# Patient Record
Sex: FEMALE | Race: WHITE
[De-identification: names, ages, dates, MRNs, and addresses within clinical notes are randomized per-mention and may not be internally consistent; named-entity substitution may affect disease eponyms.]

---

## 2019-01-22 ENCOUNTER — HOSPITAL ENCOUNTER (INPATIENT)
Dept: HOSPITAL 36 - ER | Age: 69
LOS: 5 days | Discharge: NURSING, REHAB OR LONG-TERM CARE | DRG: 870 | End: 2019-01-27
Attending: FAMILY MEDICINE | Admitting: FAMILY MEDICINE
Payer: MEDICARE

## 2019-01-22 DIAGNOSIS — A41.9: Primary | ICD-10-CM

## 2019-01-22 DIAGNOSIS — J98.11: ICD-10-CM

## 2019-01-22 DIAGNOSIS — K80.80: ICD-10-CM

## 2019-01-22 DIAGNOSIS — E11.22: ICD-10-CM

## 2019-01-22 DIAGNOSIS — I42.9: ICD-10-CM

## 2019-01-22 DIAGNOSIS — N17.9: ICD-10-CM

## 2019-01-22 DIAGNOSIS — J44.0: ICD-10-CM

## 2019-01-22 DIAGNOSIS — G93.41: ICD-10-CM

## 2019-01-22 DIAGNOSIS — Z93.1: ICD-10-CM

## 2019-01-22 DIAGNOSIS — N18.3: ICD-10-CM

## 2019-01-22 DIAGNOSIS — N39.0: ICD-10-CM

## 2019-01-22 DIAGNOSIS — E87.1: ICD-10-CM

## 2019-01-22 DIAGNOSIS — M10.9: ICD-10-CM

## 2019-01-22 DIAGNOSIS — I48.91: ICD-10-CM

## 2019-01-22 DIAGNOSIS — J96.20: ICD-10-CM

## 2019-01-22 DIAGNOSIS — E78.5: ICD-10-CM

## 2019-01-22 DIAGNOSIS — K72.10: ICD-10-CM

## 2019-01-22 DIAGNOSIS — Z93.0: ICD-10-CM

## 2019-01-22 DIAGNOSIS — F03.90: ICD-10-CM

## 2019-01-22 DIAGNOSIS — J18.9: ICD-10-CM

## 2019-01-22 DIAGNOSIS — E87.6: ICD-10-CM

## 2019-01-22 DIAGNOSIS — Z86.73: ICD-10-CM

## 2019-01-22 DIAGNOSIS — Z99.11: ICD-10-CM

## 2019-01-22 DIAGNOSIS — I12.9: ICD-10-CM

## 2019-01-22 DIAGNOSIS — D63.8: ICD-10-CM

## 2019-01-22 DIAGNOSIS — E11.21: ICD-10-CM

## 2019-01-22 DIAGNOSIS — E86.0: ICD-10-CM

## 2019-01-22 DIAGNOSIS — K76.0: ICD-10-CM

## 2019-01-22 DIAGNOSIS — E03.9: ICD-10-CM

## 2019-01-22 LAB
ALBUMIN SERPL-MCNC: 4 GM/DL (ref 3.7–5.3)
ALBUMIN/GLOB SERPL: 0.9 {RATIO} (ref 1–1.8)
ALP SERPL-CCNC: 121 U/L (ref 34–104)
ALT SERPL-CCNC: 100 U/L (ref 7–52)
AMORPH SED URNS QL MICRO: (no result)
ANION GAP SERPL CALC-SCNC: 22.9 MMOL/L (ref 7–16)
APPEARANCE UR: (no result)
AST SERPL-CCNC: 76 U/L (ref 13–39)
BACTERIA #/AREA URNS HPF: (no result) /HPF
BILIRUB SERPL-MCNC: 1.1 MG/DL (ref 0.3–1)
BILIRUB UR-MCNC: NEGATIVE MG/DL
BUN SERPL-MCNC: 151 MG/DL (ref 7–25)
CALCIUM SERPL-MCNC: 10.6 MG/DL (ref 8.6–10.3)
CHLORIDE SERPL-SCNC: 76 MEQ/L (ref 98–107)
CO2 SERPL-SCNC: 29.3 MEQ/L (ref 21–31)
COLOR UR: YELLOW
CREAT SERPL-MCNC: 1.9 MG/DL (ref 0.6–1.2)
EOSINOPHIL NFR BLD AUTO: 3 % (ref 0–5)
EOSINOPHIL NFR BLD: 2 % (ref 0–5)
EPI CELLS URNS QL MICRO: (no result) /LPF
ERYTHROCYTE [DISTWIDTH] IN BLOOD BY AUTOMATED COUNT: 15.5 % (ref 11.5–20)
GLOBULIN SER-MCNC: 4.6 GM/DL
GLUCOSE SERPL-MCNC: 237 MG/DL (ref 70–105)
GLUCOSE UR STRIP-MCNC: NEGATIVE MG/DL
HCT VFR BLD CALC: 29.1 % (ref 41–60)
HGB BLD-MCNC: 9.9 GM/DL (ref 12–16)
KETONES UR STRIP-MCNC: NEGATIVE MG/DL
LEUKOCYTE ESTERASE UR-ACNC: (no result)
LYMPHOCYTES # BLD MANUAL: 5 % (ref 20–50)
LYMPHOCYTES NFR BLD AUTO: 5 % (ref 20–50)
MCH RBC QN AUTO: 33.4 PG (ref 27–31)
MCHC RBC AUTO-ENTMCNC: 33.9 PG (ref 28–36)
MCV RBC AUTO: 98.8 FL (ref 81–100)
MICRO URNS: YES
MONOCYTES # BLD MANUAL: 3 % (ref 2–10)
MONOCYTES NFR BLD AUTO: 5 % (ref 2–10)
NEUTROPHILS NFR BLD AUTO: 87 % (ref 40–80)
NEUTROPHILS NFR BLD AUTO: 87 % (ref 40–80)
NEUTS BAND NFR BLD: 3 % (ref 0–10)
NITRITE UR QL STRIP: NEGATIVE
PH UR STRIP: 5.5 [PH] (ref 4.6–8)
PLATELET # BLD: 409 TH/CMM (ref 150–400)
PMV BLD AUTO: 7.2 FL
POTASSIUM SERPL-SCNC: 3.2 MEQ/L (ref 3.5–5.1)
PROT UR STRIP-MCNC: NEGATIVE MG/DL
RBC # BLD AUTO: 2.94 MIL/CMM (ref 3.8–5.2)
RBC # UR STRIP: NEGATIVE /UL
RBC #/AREA URNS HPF: (no result) /HPF (ref 0–5)
SODIUM SERPL-SCNC: 125 MEQ/L (ref 136–145)
SP GR UR STRIP: 1.01 (ref 1–1.03)
TROPONIN I SERPL-MCNC: 0.05 NG/ML (ref 0.01–0.05)
URINALYSIS COMPLETE PNL UR: (no result)
UROBILINOGEN UR STRIP-ACNC: 0.2 E.U./DL (ref 0.2–1)
WBC # BLD AUTO: 14.3 TH/CMM (ref 4.8–10.8)
WBC #/AREA URNS HPF: (no result) /HPF (ref 0–5)
YEAST URNS QL MICRO: (no result) /HPF

## 2019-01-22 PROCEDURE — Z7610: HCPCS

## 2019-01-22 PROCEDURE — C9113 INJ PANTOPRAZOLE SODIUM, VIA: HCPCS

## 2019-01-22 PROCEDURE — 5A1955Z RESPIRATORY VENTILATION, GREATER THAN 96 CONSECUTIVE HOURS: ICD-10-PCS | Performed by: FAMILY MEDICINE

## 2019-01-22 NOTE — ED PHYSICIAN CHART
ED Chief Complaint/HPI





- Patient Information


Date Seen:: 01/22/19


Time Seen:: 20:16


Chief Complaint:: Abnormal labs


History of Present Illness:: 


69 yo female with history of COPD, respiratory failure on ventilation via a 

trach tube, G-tube, sepsis, dysphagia, hepatic failure, anemia, edema, 

cardiomyopathy, AFib, TIA/stroke, gout, hypothyroidism and CKD was brought from 

[facility] to ER for evaluation of abnormal labs (, K 3.2) today. Patient is 

obtunded. History is based on chart review.


Allergies:: 


 Allergies











Allergy/AdvReac Type Severity Reaction Status Date / Time


 


No Known Allergies Allergy   Verified 01/22/19 19:59











Vitals:: 


 Vital Signs - 8 hr











  01/22/19





  19:45


 


Temp 98.7 F


 


HR 84


 


RR 16


 


/48


 


O2 Sat % 100














ED Review of Systems





- Review of Systems


General/Constitutional: No fever


Skin: No bruising


Eyes: No pain


ENT: No nasal drainage


Neck: Other (Tracheostomy)


Cardio Vascular: No chest pain, edema


Pulmonary: SOB


GI: No nausea, No vomiting


Musculoskeletal: No muscle pain


Neurological: No seizure





ED Past Medical History





- Past Medical History


Past Medical History: Asthma/COPD, CVA/TIA, Thyroid disorder (hypothyroidism), 

Other (RESPIRATORY FAILURE, G-TUBE, PNEUMONIA, SEPSIS, DYSPHAGIA, HEPATIC 

FAILURE, EDEMA, ANEMIA, CARDIOMYOPATHY, A-FIB, GOUT, CKD)


Social History: Non Smoker, No Alcohol, No Drug Use


Surgical History: PEG/GTube





Family Medical History





- Family Member


  ** Mother


History Unknown: Yes





ED Physical Exam





- Physical Examination


Other Gen/Cons comments:: 


Obtunded


Head: Atraumatic


Eyes: PERRL


Other Skin comments:: 


Edema


ENMT: Nasal exam nl


Other Neck comments:: 


Trach intact


Other Respiratory comments:: 


B/L rhonchi


Cardio Vascular: RRR, No murmur, gallop, rubs, NL S1 S2


GI: Nondistended


Other GI comments:: 


G-tube intact


Other Extremities comments:: 


1+ edema of BLE


Other Neuro/Psych comments:: 


obtunded





ED Labs/Radiology/EKG Results





- Lab Results


Results: 





 Laboratory Last Values











WBC  15.2 Th/cmm (4.8-10.8)  H  01/23/19  04:25    


 


RBC  2.43 Mil/cmm (3.80-5.20)  L  01/23/19  04:25    


 


Hgb  8.4 gm/dL (12-16)  L  01/23/19  04:25    


 


Hct  24.2 % (41.0-60)  L  01/23/19  04:25    


 


MCV  99.4 fl ()   01/23/19  04:25    


 


MCH  34.4 pg (27.0-31.0)  H  01/23/19  04:25    


 


MCHC Differential  34.6 pg (28.0-36.0)   01/23/19  04:25    


 


RDW  16.1 % (11.5-20.0)   01/23/19  04:25    


 


Plt Count  355 Th/cmm (150-400)   01/23/19  04:25    


 


MPV  7.4 fl  01/23/19  04:25    


 


Add Manual Diff  YES   01/23/19  04:25    


 


Neutrophils %  87.0 % (40.0-80.0)  H  01/22/19  20:15    


 


Band Neutrophils %  5 % (0-10)   01/23/19  04:25    


 


Lymphocytes %  5.0 % (20.0-50.0)  L  01/22/19  20:15    


 


Monocytes %  5.0 % (2.0-10.0)   01/22/19  20:15    


 


Eosinophils %  3.0 % (0.0-5.0)   01/22/19  20:15    


 


Neutrophils (Manual)  79 % (40-80)   01/23/19  04:25    


 


Lymphocytes  3 % (20-50)  L  01/23/19  04:25    


 


Monocytes  3 % (2-10)   01/23/19  04:25    


 


Eosinophils  10 % (0-5)  H  01/23/19  04:25    


 


Basophils  0 % (0-3)   01/23/19  04:25    


 


Sodium  132 mEq/L (136-145)  L  01/23/19  04:25    


 


Potassium  3.2 mEq/L (3.5-5.1)  L  01/23/19  04:25    


 


Chloride  89 mEq/L ()  L  01/23/19  04:25    


 


Carbon Dioxide  26.9 mEq/L (21.0-31.0)   01/23/19  04:25    


 


Anion Gap  19.3  (7.0-16.0)  H  01/23/19  04:25    


 


BUN  125 mg/dL (7-25)  H*  01/23/19  04:25    


 


Creatinine  1.6 mg/dL (0.6-1.2)  H  01/23/19  04:25    


 


Est GFR ( Amer)  41.5 ml/min (>90)   01/23/19  04:25    


 


Est GFR (Non-Af Amer)  34.3 ml/min  01/23/19  04:25    


 


BUN/Creatinine Ratio  78.1   01/23/19  04:25    


 


Glucose  238 mg/dL ()  H  01/23/19  04:25    


 


POC Glucose  251 MG/DL (70 - 105)  H  01/23/19  05:18    


 


Whole Bld Lactic Acid  4.81 mmol/L (0.60-1.99)  H*  01/23/19  07:15    


 


Calcium  9.4 mg/dL (8.6-10.3)   01/23/19  04:25    


 


Phosphorus  2.9 mg/dL (2.5-5.0)   01/23/19  04:25    


 


Magnesium  3.1 mg/dL (1.9-2.7)  H  01/23/19  04:25    


 


Total Bilirubin  1.1 mg/dL (0.3-1.0)  H  01/22/19  20:15    


 


AST  76 U/L (13-39)  H  01/22/19  20:15    


 


ALT  100 U/L (7-52)  H  01/22/19  20:15    


 


Alkaline Phosphatase  121 U/L ()  H  01/22/19  20:15    


 


Troponin I  0.05 ng/mL (0.01-0.05)   01/22/19  20:15    


 


B-Natriuretic Peptide  146.0 pg/mL (5.0-100.0)  H  01/23/19  04:25    


 


Total Protein  8.6 gm/dL (6.0-8.3)  H  01/22/19  20:15    


 


Albumin  4.0 gm/dL (3.7-5.3)   01/22/19  20:15    


 


Globulin  4.6 gm/dL  01/22/19  20:15    


 


Albumin/Globulin Ratio  0.9  (1.0-1.8)  L  01/22/19  20:15    


 


Triglycerides  813 mg/dL (<150)  H  01/23/19  04:25    


 


Cholesterol  397 mg/dL (<200)  H  01/23/19  04:25    


 


LDL Cholesterol Direct  187 mg/dL ()   01/23/19  04:25    


 


HDL Cholesterol  34 mg/dL (23-92)   01/23/19  04:25    


 


TSH  3.28 uIU/ml (0.34-5.60)   01/23/19  04:25    


 


Urine Source  CLEAN C   01/22/19  20:30    


 


Urine Color  YELLOW   01/22/19  20:30    


 


Urine Clarity  HAZY  (CLEAR)   01/22/19  20:30    


 


Urine pH  5.5  (4.6 - 8.0)   01/22/19  20:30    


 


Ur Specific Gravity  1.010  (1.005-1.030)   01/22/19  20:30    


 


Urine Protein  NEGATIVE mg/dL (NEGATIVE)   01/22/19  20:30    


 


Urine Glucose (UA)  NEGATIVE mg/dL (NEGATIVE)   01/22/19  20:30    


 


Urine Ketones  NEGATIVE mg/dL (NEGATIVE)   01/22/19  20:30    


 


Urine Blood  NEGATIVE  (NEGATIVE)   01/22/19  20:30    


 


Urine Nitrate  NEGATIVE  (NEGATIVE)   01/22/19  20:30    


 


Urine Bilirubin  NEGATIVE  (NEGATIVE)   01/22/19  20:30    


 


Urine Urobilinogen  0.2 E.U./dL (0.2 - 1.0)   01/22/19  20:30    


 


Ur Leukocyte Esterase  SMALL  (NEGATIVE)  H  01/22/19  20:30    


 


Urine RBC  5-10 /hpf (0-5)  H  01/22/19  20:30    


 


Urine WBC  2-5 /hpf (0-5)   01/22/19  20:30    


 


Ur Epithelial Cells  RARE /lpf (FEW)   01/22/19  20:30    


 


Amorphous Sediment  FEW URATES  (NONE SEEN)   01/22/19  20:30    


 


Urine Bacteria  FEW /hpf (NONE SEEN)   01/22/19  20:30    


 


Urine Yeast  FEW /hpf (NONE SEEN)  H  01/22/19  20:30    














- Radiology Results


Results: 


CXR: suspect trace left effusion, no focal consolidation, tracheostomy tube





- EKG Interpretations


EKG Time:: 20:37


Rate & Rhythm: 79 bpm, SR


Axis: normal P axis


Intervals: prolonged QT interval


Comments:: 


Abnormal EKG





ED Assessment





- Assessment


General Assessment: 


Sepsis


UTI


Dehydration


Hyponatremia


Hypokalemia


Acute renal failure, pre-renal


Metabolic encephalopathy


Anemia, normocytic





Assessment/Comments:: 


CBC, CMP, Trop, BNP, lactic acid


Urine culture, sputum culture, blood culture


EKG, CXR


Trach to vent: AC16, , PEEP5, 2.5L O2


NS 1L IV bolus


Rocephin 1g IV


K-rider 20mEq IV


Admit to ICU





ED Septic Shock





- .


Is Septic Shock (SBP<90, OR Lactate>4 mmol\L) present?: No





- <6hrs of presentation:


Vital Signs: 


 Vital Signs - 8 hr











  01/22/19





  19:45


 


Temp 98.7 F


 


HR 84


 


RR 16


 


/48


 


O2 Sat % 100














ED Reassessment (Disposition)





- Reassessment


Reassessment Condition:: Improved





- Patient Disposition


Discharge/Transfer:: Acute Care w/in this hosp


Admitting Medical Physician:: Briseida Coelho

## 2019-01-23 VITALS — SYSTOLIC BLOOD PRESSURE: 109 MMHG | DIASTOLIC BLOOD PRESSURE: 48 MMHG

## 2019-01-23 LAB
ANION GAP SERPL CALC-SCNC: 19.3 MMOL/L (ref 7–16)
BASOPHILS NFR BLD: 0 % (ref 0–3)
BUN SERPL-MCNC: 125 MG/DL (ref 7–25)
CALCIUM SERPL-MCNC: 9.4 MG/DL (ref 8.6–10.3)
CHLORIDE SERPL-SCNC: 89 MEQ/L (ref 98–107)
CHOLEST SERPL-MCNC: 397 MG/DL (ref ?–200)
CO2 SERPL-SCNC: 26.9 MEQ/L (ref 21–31)
CREAT SERPL-MCNC: 1.6 MG/DL (ref 0.6–1.2)
EOSINOPHIL # BLD MANUAL: (no result) 10*3/UL
EOSINOPHIL BLD QL WRIGHT STN: (no result)
EOSINOPHIL NFR BLD: 10 % (ref 0–5)
ERYTHROCYTE [DISTWIDTH] IN BLOOD BY AUTOMATED COUNT: 16.1 % (ref 11.5–20)
GLUCOSE SERPL-MCNC: 238 MG/DL (ref 70–105)
HCT VFR BLD CALC: 24.2 % (ref 41–60)
HDLC SERPL-MCNC: 34 MG/DL (ref 23–92)
HGB BLD-MCNC: 8.4 GM/DL (ref 12–16)
LYMPHOCYTES # BLD MANUAL: 3 % (ref 20–50)
MAGNESIUM SERPL-MCNC: 3.1 MG/DL (ref 1.9–2.7)
MCH RBC QN AUTO: 34.4 PG (ref 27–31)
MCHC RBC AUTO-ENTMCNC: 34.6 PG (ref 28–36)
MCV RBC AUTO: 99.4 FL (ref 81–100)
MONOCYTES # BLD MANUAL: 3 % (ref 2–10)
NEUTROPHILS NFR BLD AUTO: 79 % (ref 40–80)
NEUTS BAND NFR BLD: 5 % (ref 0–10)
PCO2 BLDA: 39 MMHG (ref 35–45)
PHOSPHATE SERPL-MCNC: 2.9 MG/DL (ref 2.5–5)
PLATELET # BLD: 355 TH/CMM (ref 150–400)
PMV BLD AUTO: 7.4 FL
PO2 BLDA: 104 MMHG (ref 80–100)
POTASSIUM SERPL-SCNC: 3.2 MEQ/L (ref 3.5–5.1)
RBC # BLD AUTO: 2.43 MIL/CMM (ref 3.8–5.2)
SAO2 % BLDA: 98 % (ref 92–100)
SODIUM SERPL-SCNC: 132 MEQ/L (ref 136–145)
TRIGL SERPL-MCNC: 813 MG/DL (ref ?–150)
WBC # BLD AUTO: 15.2 TH/CMM (ref 4.8–10.8)

## 2019-01-23 RX ADMIN — SODIUM CHLORIDE SCH MLS/HR: 9 INJECTION, SOLUTION INTRAVENOUS at 12:40

## 2019-01-23 RX ADMIN — ALBUTEROL SULFATE PRN MG: 2.5 SOLUTION RESPIRATORY (INHALATION) at 15:22

## 2019-01-23 RX ADMIN — EPOETIN ALFA SCH UNITS: 20000 SOLUTION INTRAVENOUS; SUBCUTANEOUS at 16:57

## 2019-01-23 RX ADMIN — DOCUSATE SODIUM SCH: 50 LIQUID ORAL at 21:45

## 2019-01-23 RX ADMIN — INSULIN ASPART SCH UNITS: 100 INJECTION, SOLUTION INTRAVENOUS; SUBCUTANEOUS at 12:40

## 2019-01-23 RX ADMIN — INSULIN DETEMIR SCH UNITS: 100 INJECTION, SOLUTION SUBCUTANEOUS at 17:52

## 2019-01-23 RX ADMIN — DOCUSATE SODIUM SCH MG: 50 LIQUID ORAL at 20:38

## 2019-01-23 RX ADMIN — SODIUM CHLORIDE SCH MLS/HR: 9 INJECTION, SOLUTION INTRAVENOUS at 00:12

## 2019-01-23 RX ADMIN — INSULIN ASPART SCH: 100 INJECTION, SOLUTION INTRAVENOUS; SUBCUTANEOUS at 23:54

## 2019-01-23 RX ADMIN — INSULIN ASPART SCH UNITS: 100 INJECTION, SOLUTION INTRAVENOUS; SUBCUTANEOUS at 05:36

## 2019-01-23 RX ADMIN — INSULIN ASPART SCH UNITS: 100 INJECTION, SOLUTION INTRAVENOUS; SUBCUTANEOUS at 17:52

## 2019-01-23 RX ADMIN — SODIUM CHLORIDE SCH MLS/HR: 9 INJECTION, SOLUTION INTRAVENOUS at 23:47

## 2019-01-23 RX ADMIN — LACTULOSE SCH GM: 20 SOLUTION ORAL at 17:50

## 2019-01-23 RX ADMIN — SODIUM CHLORIDE SCH MLS/HR: 9 INJECTION, SOLUTION INTRAVENOUS at 17:50

## 2019-01-23 RX ADMIN — SODIUM CHLORIDE SCH MLS/HR: 9 INJECTION, SOLUTION INTRAVENOUS at 05:16

## 2019-01-23 RX ADMIN — INSULIN ASPART SCH UNITS: 100 INJECTION, SOLUTION INTRAVENOUS; SUBCUTANEOUS at 00:13

## 2019-01-23 NOTE — DIAGNOSTIC IMAGING REPORT
CHEST X-RAY: AP view



INDICATION: Respiratory failure, pneumonia



COMPARISON: 1/22/2019



FINDINGS: Tracheostomy tube is stable.  Chronic lung changes are noted. 

Suboptimal lung volumes are seen with bibasal atelectatic changes.  No

focal consolidation or gross effusions.  Mild cardiomegaly is noted.



IMPRESSION:



Suboptimal lung volumes and bibasal atelectatic changes.  No focal

consolidation identified.



Chronic lung changes.

## 2019-01-23 NOTE — DIAGNOSTIC IMAGING REPORT
CHEST X-RAY: AP view



INDICATION: Shortness of breath



COMPARISON: None



FINDINGS: Tracheostomy tube is noted.  Suboptimal lung markings are seen

with mild elevation of left hemidiaphragm increased left basal lung

markings.  Chronic lung changes are also noted.  Mild Cardiomegaly is

noted.  No focal consolidation.  There may be trace left pleural fluid. 

Degenerative changes of the spine are noted.



IMPRESSION:



Chronic lung changes with increased left basal lung markings which may

be due to subsegmental atelectasis.  Faint infiltrate of the left base

is less likely but cannot be excluded..  No focal consolidation

identified.



Suspect trace left effusion.



Mild cardiomegaly.

## 2019-01-23 NOTE — CONSULTATION
DATE OF CONSULTATION:  01/23/2019



ATTENDING:  Dina Coelho M.D.



REASON FOR CONSULTATION:  Worsening kidney function, electrolyte imbalance, and

fluid management.



HISTORY OF PRESENT ILLNESS:  This is a 68-year-old  female with past

medical history of chronic kidney disease, who was brought in because of

abnormal labs.



A few hours prior to admission, the patient had labs drawn, which revealed a BUN

of greater than 150, creatinine of 1.9, sodium of 124, potassium 3.2.  She was

eventually brought to the Emergency Room.



Her white count was 14.3.  Chest x-ray revealed no acute disease.  Temperature

was 98.7 degrees.



Her BUN/creatinine were 151/1.9 with a sodium of 125 and potassium of 3.2. 

Lactic acid was 3.86.



PAST MEDICAL HISTORY:

1.  Chronic kidney disease.

2.  Respiratory failure, vent dependent.

3.  Chronic liver failure.

4.  Essential hypertension.

5.  Hypothyroidism.

6.  Type 2 diabetes mellitus.

7.  Chronic obstructive pulmonary disease.

8.  Dementia without behavioral disturbance.

9.  Gout.

10.  Status post transient ischemic attack.



PAST SURGICAL HISTORY:

1.  Status post tracheostomy.

2.  Status post PEG placement.



CURRENT MEDICATIONS:  She is currently on acetaminophen, albuterol, allopurinol,

ascorbic acid, ceftriaxone, docusate sodium, Epogen, ferrous sulfate, aspart,

KCl, lactulose, levothyroxine, multivitamins with minerals, Zofran, Protonix,

piperacillin sodium, sevelamer, vitamin B complex, piperacillin tazobactam.



ALLERGIES:  No known drug allergies.



SOCIAL AND FAMILY HISTORY:  I was not able to obtain directly from the patient

because she is currently on a ventilator.



REVIEW OF SYSTEMS:  Again, I was not able to decipher from the patient because

of the same reason.



PHYSICAL EXAMINATION:

GENERAL:  The patient is obtunded, right now on a ventilator, but not in any

form of distress.

VITAL SIGNS:  Her blood pressure is 109/48, pulse 81, temperature 98.1 degrees.

SKIN:  Poor turgor.  Warm.  No rash, no jaundice appreciated.

HEENT:  Head:  Normocephalic, atraumatic.  Eyes:  Unable to assess her

extraocular muscles.  Pupils are equal, round, reactive to light and

accommodates.  Anicteric sclerae.  Pale conjunctivae.  Nose:  Midline nasal

septum.  Mouth:  Dry mucosa with poor dentition.

NECK:  Supple, no adenopathy, no thyromegaly, no bruits.  Presence of midline

endotracheal tube.

CHEST AND CVS:  S1, S2.  No rub, murmur, no gallop appreciated.  Point of

maximal impulse fifth intercostal space, left lateral clavicular line.  There

are no bruits either diastolic.  No abdominal or femoral bruits appreciated.

LUNGS:  Equal expansion.  No use of accessory muscles.  No supraclavicular

retractions.  Decreased breath sounds, scattered rhonchi, but no rales nor

wheezes appreciated.

BREASTS:  Pendulous symmetrical without any discharge.

ABDOMEN:  Obese, soft, questionable ascites, decreased bowel sounds.  No bruits

either diastolic or systolic.

RECTAL:  Unable to perform due to the patient's size and position.

GENITOURINARY:  Normal appearing female genitalia with indwelling Pacheco

catheter.

MUSCULOSKELETAL:  No effusions present in her joints, but unable to assess her

range of motion.

EXTREMITIES:  No evidence of edema, cyanosis, or clubbing with palpable femoral,

popliteal and dorsalis pedis pulses.

NEUROLOGIC:  The patient is obtunded at the present time, so I was not able to

pursue further by neuro exam.



LABORATORY DATA:  Revealed sodium 132, potassium 3.2, chloride 89, bicarbonate

26, , creatinine 1.6, glucose 238, magnesium 3.1, phosphorus 2.9, calcium

9.4.  , cholesterol 397, triglycerides 813, , HDL 34.  TSH 3.28.



IMPRESSION:

1.  Acute kidney injury on chronic kidney disease, MDRD GFR 34 mL per minute,

stage 3.



The patient's chronic kidney disease is secondary to longstanding history of

diabetes, giving rise to diabetic nephropathy.  She also has some underlying

hypertension, which could also lead to development of hypertensive

nephrosclerosis.



Acute kidney injury with markedly elevated BUN to creatinine ratio is likely

prerenal in nature.  She also has electrolyte imbalance, suggestive of some form

of electrolyte loss, which could be due to nausea and vomiting as well as

diarrhea.  This could explain the prerenal component.  However, this was also

supported by physical exam of poor skin turgor with dry oral mucosa.



Elevated BUN to creatinine ratio may also suggest some form of hypercatabolism

in the presence of severe sepsis.

2.  Electrolyte imbalance, etiology unknown with hyponatremia and hypokalemia. 

Again, with this combination, the possibility of nausea and vomiting or diarrhea

may be a causative factor.  The patient currently is not on any diuretics.

3.  Lactic acidosis secondary to ongoing severe sepsis.

4.  Sepsis, possibly due to complicated urinary tract infection.

5.  Respiratory failure, vent dependent.

6.  Chronic liver failure, possibly due to non-alcoholic fatty liver disease.

7.  Dyslipidemia.

8.  Essential hypertension.

9.  Hypothyroidism.

10.  Type 2 diabetes mellitus.

11.  Chronic obstructive pulmonary disease.

12.  Dementia without behavioral disturbance.

13.  Gout.

14.  Status post transient ischemic attack.



PLAN:

1.  Continue with normal saline.

2.  Urine C and S and blood culture x 2.

3.  Urine sodium, eosinophils, and creatinine.

4.  Urine microalbumin to creatinine ratio.

5.  Renal along with abdominal ultrasound.

6.  Electrolytes, ammonia level, ,lipase and CBC, hemoglobin A1c, urinalysis,

lactic acid level, aldosterone along with cortisol.



Thank you Dr. Coelho for this consult.  We will follow the patient closely with

you.





DD: 01/23/2019 14:36

DT: 01/23/2019 20:08

[facility]# [id number]  [id number]

## 2019-01-24 LAB
ALBUMIN SERPL-MCNC: 3.2 GM/DL (ref 3.7–5.3)
ALBUMIN/GLOB SERPL: 0.9 {RATIO} (ref 1–1.8)
ALP SERPL-CCNC: 99 U/L (ref 34–104)
ALT SERPL-CCNC: 89 U/L (ref 7–52)
ANION GAP SERPL CALC-SCNC: 16.5 MMOL/L (ref 7–16)
AST SERPL-CCNC: 69 U/L (ref 13–39)
BASOPHILS NFR BLD: 0 % (ref 0–3)
BILIRUB SERPL-MCNC: 0.8 MG/DL (ref 0.3–1)
BUN SERPL-MCNC: 79 MG/DL (ref 7–25)
CALCIUM SERPL-MCNC: 9 MG/DL (ref 8.6–10.3)
CHLORIDE SERPL-SCNC: 106 MEQ/L (ref 98–107)
CO2 SERPL-SCNC: 24.7 MEQ/L (ref 21–31)
CREAT SERPL-MCNC: 1.4 MG/DL (ref 0.6–1.2)
EOSINOPHIL NFR BLD: 5 % (ref 0–5)
ERYTHROCYTE [DISTWIDTH] IN BLOOD BY AUTOMATED COUNT: 16.2 % (ref 11.5–20)
GLOBULIN SER-MCNC: 3.5 GM/DL
GLUCOSE SERPL-MCNC: 190 MG/DL (ref 70–105)
HCT VFR BLD CALC: 24.3 % (ref 41–60)
HGB BLD-MCNC: 8.3 GM/DL (ref 12–16)
LYMPHOCYTES # BLD MANUAL: 5 % (ref 20–50)
MAGNESIUM SERPL-MCNC: 2.9 MG/DL (ref 1.9–2.7)
MCH RBC QN AUTO: 34 PG (ref 27–31)
MCHC RBC AUTO-ENTMCNC: 34 PG (ref 28–36)
MCV RBC AUTO: 100.1 FL (ref 81–100)
MONOCYTES # BLD MANUAL: 5 % (ref 2–10)
NEUTROPHILS NFR BLD AUTO: 85 % (ref 40–80)
NEUTS BAND NFR BLD: 0 % (ref 0–10)
PHOSPHATE SERPL-MCNC: 1.7 MG/DL (ref 2.5–5)
PLATELET # BLD: 343 TH/CMM (ref 150–400)
PMV BLD AUTO: 6.6 FL
POTASSIUM SERPL-SCNC: 4.2 MEQ/L (ref 3.5–5.1)
RBC # BLD AUTO: 2.43 MIL/CMM (ref 3.8–5.2)
SODIUM SERPL-SCNC: 143 MEQ/L (ref 136–145)
URATE SERPL-MCNC: 6.3 MG/DL (ref 2.3–6.6)
WBC # BLD AUTO: 11.1 TH/CMM (ref 4.8–10.8)

## 2019-01-24 RX ADMIN — CHLORHEXIDINE GLUCONATE SCH ML: 1.2 RINSE ORAL at 20:35

## 2019-01-24 RX ADMIN — INSULIN ASPART SCH UNITS: 100 INJECTION, SOLUTION INTRAVENOUS; SUBCUTANEOUS at 23:50

## 2019-01-24 RX ADMIN — SODIUM CHLORIDE SCH MLS/HR: 9 INJECTION, SOLUTION INTRAVENOUS at 05:38

## 2019-01-24 RX ADMIN — ALBUTEROL SULFATE PRN MG: 2.5 SOLUTION RESPIRATORY (INHALATION) at 13:22

## 2019-01-24 RX ADMIN — LACTULOSE SCH: 20 SOLUTION ORAL at 17:04

## 2019-01-24 RX ADMIN — Medication SCH: at 09:26

## 2019-01-24 RX ADMIN — SODIUM CHLORIDE SCH MLS/HR: 9 INJECTION, SOLUTION INTRAVENOUS at 23:50

## 2019-01-24 RX ADMIN — ALBUTEROL SULFATE PRN MG: 2.5 SOLUTION RESPIRATORY (INHALATION) at 07:23

## 2019-01-24 RX ADMIN — INSULIN ASPART SCH UNITS: 100 INJECTION, SOLUTION INTRAVENOUS; SUBCUTANEOUS at 17:06

## 2019-01-24 RX ADMIN — INSULIN ASPART SCH UNITS: 100 INJECTION, SOLUTION INTRAVENOUS; SUBCUTANEOUS at 12:04

## 2019-01-24 RX ADMIN — SODIUM CHLORIDE SCH MLS/HR: 9 INJECTION, SOLUTION INTRAVENOUS at 17:04

## 2019-01-24 RX ADMIN — LACTULOSE SCH: 20 SOLUTION ORAL at 09:26

## 2019-01-24 RX ADMIN — CHLORHEXIDINE GLUCONATE SCH ML: 1.2 RINSE ORAL at 09:23

## 2019-01-24 RX ADMIN — INSULIN DETEMIR SCH UNITS: 100 INJECTION, SOLUTION SUBCUTANEOUS at 09:20

## 2019-01-24 RX ADMIN — INSULIN ASPART SCH: 100 INJECTION, SOLUTION INTRAVENOUS; SUBCUTANEOUS at 05:38

## 2019-01-24 RX ADMIN — ALBUTEROL SULFATE PRN MG: 2.5 SOLUTION RESPIRATORY (INHALATION) at 19:52

## 2019-01-24 RX ADMIN — INSULIN DETEMIR SCH UNITS: 100 INJECTION, SOLUTION SUBCUTANEOUS at 17:07

## 2019-01-24 RX ADMIN — LEVOTHYROXINE SODIUM SCH: 125 TABLET ORAL at 06:51

## 2019-01-24 RX ADMIN — SODIUM CHLORIDE SCH MLS/HR: 9 INJECTION, SOLUTION INTRAVENOUS at 12:02

## 2019-01-24 RX ADMIN — DOCUSATE SODIUM SCH: 50 LIQUID ORAL at 20:35

## 2019-01-24 NOTE — DIAGNOSTIC IMAGING REPORT
Portable chest x-ray



HISTORY: Shortness of breath



Compared with prior exam of January 23, 2019, there is a very poor

inspiration.  Heart size difficult to assess.  Allowing for the poor

inspiration, no acute focal pulmonary processes.  Slight pleural

reaction noted about the right costophrenic angle.



IMPRESSION:

1.  Allowing for a poor inspiration, no definite acute focal pulmonary

parenchymal processes

## 2019-01-24 NOTE — CONSULTATION
DATE OF CONSULTATION:  01/24/2019



REFERRING PHYSICIAN:  Dr. Coelho.



REASON FOR CONSULTATION:  Leukocytosis.



HISTORY OF PRESENT ILLNESS:  The patient is a 68-year-old female with a past

medical history of chronic kidney disease, respiratory failure, on vent

dependence, chronic liver disease, hypertension, hypothyroidism, diabetes

mellitus type 2, chronic obstructive pulmonary disease, dementia, gout, and

transient ischemic attack, brought in because of abnormal labs.  At the nursing

facility, the patient's BUN is 150, creatinine 1.9, and sodium 124.  Her

hemoglobin was also 9.0.  Prior to her transfer, her temperature was 99.2

degrees Fahrenheit.  She was brought to the ER for further evaluation.  On

initial evaluation, her temperature was 98.7 degrees Fahrenheit and WBC count

was 14,300.  Chest x-ray showed right lung changes with left basal lung markings

which may be due to subsegmental atelectasis, vent infiltrate of left base is

less likely, but cannot be excluded.  No focal consolidation; suspect trace left

pleural effusion, mild cardiomegaly.  The patient was started on IV fluid as

well as started on Zosyn.  Lactic acid was also elevated at 3.86.  ID consult

was called for further evaluation and management.  The patient is unresponsive,

unable to give any history.



PAST MEDICAL HISTORY:  Includes history of pneumonia, effusion, sepsis,

respiratory failure, on ventilator, dysphagia, G-tube placement, tracheostomy

placement, hepatic failure ____ chronic kidney disease, generalized edema,

hypertension, hypertensive heart disease, hypothyroidism, diabetes mellitus type

2, diabetic nephropathy, anemia of chronic disease, chronic obstructive

pulmonary disease, dementia, cardiomyopathy, persistent vegetative status, gout,

protein calorie mellitus, atrial fibrillation, vitamin D deficiency, history of

transient ischemic attack and cerebrovascular accident in the past.



ALLERGIES:  NKDA.



MEDICATIONS:  As per medication reconciliation sheet.  Antibiotic wise, the

patient is receiving Zosyn.



FAMILY HISTORY:  Not available.



SOCIAL HISTORY:  The patient lives at nursing facility, [facility].  No

history of smoking, alcohol or drug use.



REVIEW OF SYSTEMS:  Unable to obtain.  No fever.



PHYSICAL EXAMINATION:

GENERAL:  The patient is cachectic, not in acute distress, on the ventilator,

status post tracheostomy.

VITAL SIGNS:  Temperature 98.2, pulse 74, respirations 15, and blood pressure

136/35.

HEENT:  Head is normocephalic, atraumatic.  Oral cavity moist, pink tongue. 

Eyes:  Pallor is present, no icterus.

NECK:  Trach site is clear.

CHEST:  Bilateral breath sounds.  Crackles present.

HEART:  S1, S2 within normal limits.  Regular rhythm.  No murmur, no gallop.

ABDOMEN:  Soft, nontender, and nondistended.  Bowel sounds present.

EXTREMITIES:  No cyanosis, no clubbing, no edema.

NEUROLOGIC:  Unresponsive vegetative status.



LABORATORY DATA:  Current lab shows WBC count is 11,100, hemoglobin 8.3,

hematocrit 24.3, platelets are 343,000, and neutrophil is 85%.  Sodium 143,

potassium 4.2, chloride 106, bicarbonate is 24.7, BUN is 79, creatinine 1.4,

glucose is 190.  Lactic acid is 2.53.  Sputum culture is showing more than 10

squamous epithelial cells, poor specimen, so further workup was not done.  Blood

culture 2 sets are negative.  Chest x-ray shows allowing for poor inspiration,

noted to have acute focal parenchymal disease at this time.



IMPRESSION:

1. Leukocytosis, lactic acidosis, suspect sepsis.

2. Pneumonia, radiologically clear.

3. Diabetes mellitus type 2.

4. Acute renal failure on chronic renal disease, improving.

5. Lactic acidosis.

6. Respiratory failure, on ventilator.

7. Chronic liver disease.

8. Hypertension.

9. Hypothyroidism.

10. Diabetes mellitus type 2.

11. Chronic obstructive pulmonary disease.

12. Dementia.

13. Cerebrovascular accident, transient ischemic attack.

14. History of gout.



PLAN AND RECOMMENDATIONS:  We will continue on Zosyn and check the lactic acid

in the morning.



Thank you, Dr. Coelho for involving me in taking care of this patient.





DD: 01/24/2019 11:17

DT: 01/24/2019 16:49

JOB# [id number]  [id number]

## 2019-01-24 NOTE — CONSULTATION
DATE OF CONSULTATION:  01/23/2019



Thank you very much Dr. Coelho for this consultation.  



HISTORY OF PRESENT ILLNESS:  This is a 68-year-old female with history of

chronic respiratory failure, ventilator dependent, presented with sepsis, and

hypotension.  The patient was admitted for further treatment and management. 

Cultures were sent.  Started on empiric IV antibiotics.  The patient has

stabilized.  The blood pressure is stable now.  She has history of

cardiomyopathy, COPD, G-tube feeding, ventilator support, and liver problems. 

The patient upon presentation was found to be severe dehydration and elevated

BUN and creatinine, started on IV fluids.  The patient is awake, alert, now

comfortable, in no distress.



SOCIAL HISTORY:  Not available.



REVIEW OF SYSTEMS:  Unable to obtain because of the patient's condition.



PHYSICAL EXAMINATION:

GENERAL:  The patient is on vent, no distress.

VITAL SIGNS:  Temperature is 98.8, pulse 79, respiration is 16, blood pressure

112/55, saturation 98%.

HEENT:  Atraumatic, normocephalic.  Pupils react to light and accommodation. 

Ears, nose and throat normal.

NECK:  Supple.  No JVD.

CHEST:  There are scattered rhonchi bilaterally, no wheezing.

HEART:  Regular rate and rhythm.  No murmurs.

ABDOMEN:  Soft.

EXTREMITIES:  No edema.



LABORATORY DATA:  WBC 15.2, hemoglobin 8.4, hematocrit 24.2, platelets 355. 

ABGs:  pH 7.47, pCO2 of 39, pO2 of 104, bicarbonate 28, saturation oxygen 98%. 

Sodium 132, potassium 3.2, BUN was 125, creatinine 1.6, and now ___ and

creatinine 1.9.



Chest x-ray showed no obvious infiltrate, tracheostomy tube in place.



IMPRESSION:  This is a 68-year-old female with,

1.  Respiratory failure.

2.  Sepsis.

3.  Rule out pneumonia.

4.  Dehydration and acute renal failure.



PLAN:

1.  Continue ventilator support.

2.  Antibiotics.

3.  Pulmonary toilet and supportive care, ventilator support and follow-up chest

x-ray and cultures.  We will follow the patient with you.



Thank you very much for this consultation.





DD: 01/23/2019 17:28

DT: 01/24/2019 16:04

JOB# [id number]  [id number]

## 2019-01-24 NOTE — GENERAL PROGRESS NOTE
Subjective





- Review of Systems


Service Date: 19


Subjective: 





barely opens eyes





Objective





- Results


Result Diagrams: 


 19 04:51





 19 04:51


Recent Labs: 


 Laboratory Last Values











WBC  11.1 Th/cmm (4.8-10.8)  H  19  04:51    


 


RBC  2.43 Mil/cmm (3.80-5.20)  L  19  04:51    


 


Hgb  8.3 gm/dL (12-16)  L  19  04:51    


 


Hct  24.3 % (41.0-60)  L  19  04:51    


 


MCV  100.1 fl ()  H  19  04:51    


 


MCH  34.0 pg (27.0-31.0)  H  19  04:51    


 


MCHC Differential  34.0 pg (28.0-36.0)   19  04:51    


 


RDW  16.2 % (11.5-20.0)   19  04:51    


 


Plt Count  343 Th/cmm (150-400)   19  04:51    


 


MPV  6.6 fl  19  04:51    


 


Add Manual Diff  YES   19  04:51    


 


Neutrophils %  87.0 % (40.0-80.0)  H  19  20:15    


 


Band Neutrophils %  0 % (0-10)   19  04:51    


 


Lymphocytes %  5.0 % (20.0-50.0)  L  19  20:15    


 


Monocytes %  5.0 % (2.0-10.0)   19  20:15    


 


Eosinophils %  3.0 % (0.0-5.0)   19  20:15    


 


Neutrophils (Manual)  85 % (40-80)  H  19  04:51    


 


Lymphocytes  5 % (20-50)  L  19  04:51    


 


Monocytes  5 % (2-10)   19  04:51    


 


Eosinophils  5 % (0-5)   19  04:51    


 


Basophils  0 % (0-3)   19  04:51    


 


Eos Smear Source  URINE   19  15:00    


 


Eos Smear Total Cells  NONE SEEN  (NONE SEEN)   19  15:00    


 


Specimen Source  Arterial   19  09:05    


 


Sample Site  LB   19  09:05    


 


pH  7.47  (7.35-7.45)  H  19  09:05    


 


pCO2  39.0 mmHg (35.0-45.0)   19  09:05    


 


pO2  104.0 mmHg (80.0-100.0)  H  19  09:05    


 


HCO3  28.4 mEq/L (20.0-26.0)  H  19  09:05    


 


Base Excess  4.4 mEq/L (-3.0-3.0)  H  19  09:05    


 


O2 Saturation  98.0 % (92.0-100.0)   19  09:05    


 


Vent Rate  16   19  09:05    


 


Inspired O2  28   19  09:05    


 


Tidal Volume  450   19  09:05    


 


PEEP  5   19  09:05    


 


Critical Value  PW   19  09:05    


 


Sodium  143 mEq/L (136-145)   19  04:51    


 


Potassium  4.2 mEq/L (3.5-5.1)   19  04:51    


 


Chloride  106 mEq/L ()   19  04:51    


 


Carbon Dioxide  24.7 mEq/L (21.0-31.0)   19  04:51    


 


Anion Gap  16.5  (7.0-16.0)  H  19  04:51    


 


BUN  79 mg/dL (7-25)  H  19  04:51    


 


Creatinine  1.4 mg/dL (0.6-1.2)  H  19  04:51    


 


Est GFR ( Amer)  48.4 ml/min (>90)   19  04:51    


 


Est GFR (Non-Af Amer)  40.0 ml/min  19  04:51    


 


BUN/Creatinine Ratio  56.4   19  04:51    


 


Glucose  190 mg/dL ()  H  19  04:51    


 


POC Glucose  159 MG/DL (70 - 105)  H  19  12:03    


 


Whole Bld Lactic Acid  2.53 mmol/L (0.60-1.99)  H*  19  08:10    


 


Uric Acid  6.3 mg/dL (2.3-6.6)   19  04:51    


 


Calcium  9.0 mg/dL (8.6-10.3)   19  04:51    


 


Phosphorus  1.7 mg/dL (2.5-5.0)  L  19  04:51    


 


Magnesium  2.9 mg/dL (1.9-2.7)  H  19  04:51    


 


Total Bilirubin  0.8 mg/dL (0.3-1.0)   19  04:51    


 


AST  69 U/L (13-39)  H  19  04:51    


 


ALT  89 U/L (7-52)  H  19  04:51    


 


Alkaline Phosphatase  99 U/L ()   19  04:51    


 


Ammonia  38 umol/L (16-53)   19  08:10    


 


Troponin I  0.05 ng/mL (0.01-0.05)   19  20:15    


 


B-Natriuretic Peptide  146.0 pg/mL (5.0-100.0)  H  19  04:25    


 


Total Protein  6.7 gm/dL (6.0-8.3)   19  04:51    


 


Albumin  3.2 gm/dL (3.7-5.3)  L  19  04:51    


 


Globulin  3.5 gm/dL  19  04:51    


 


Albumin/Globulin Ratio  0.9  (1.0-1.8)  L  19  04:51    


 


Triglycerides  813 mg/dL (<150)  H  19  04:25    


 


Cholesterol  397 mg/dL (<200)  H  19  04:25    


 


LDL Cholesterol Direct  187 mg/dL ()   19  04:25    


 


HDL Cholesterol  34 mg/dL (23-92)   19  04:25    


 


Lipase  174 U/L (11-82)  H  19  04:51    


 


TSH  3.28 uIU/ml (0.34-5.60)   19  04:25    


 


Urine Source  CLEAN C   19  20:30    


 


Urine Color  YELLOW   19  20:30    


 


Urine Clarity  HAZY  (CLEAR)   19  20:30    


 


Urine pH  5.5  (4.6 - 8.0)   19  20:30    


 


Ur Specific Gravity  1.010  (1.005-1.030)   19  20:30    


 


Urine Protein  NEGATIVE mg/dL (NEGATIVE)   19  20:30    


 


Urine Glucose (UA)  NEGATIVE mg/dL (NEGATIVE)   19  20:30    


 


Urine Ketones  NEGATIVE mg/dL (NEGATIVE)   19  20:30    


 


Urine Blood  NEGATIVE  (NEGATIVE)   19  20:30    


 


Urine Nitrate  NEGATIVE  (NEGATIVE)   19  20:30    


 


Urine Bilirubin  NEGATIVE  (NEGATIVE)   19  20:30    


 


Urine Urobilinogen  0.2 E.U./dL (0.2 - 1.0)   19  20:30    


 


Ur Leukocyte Esterase  SMALL  (NEGATIVE)  H  19  20:30    


 


Urine RBC  5-10 /hpf (0-5)  H  19  20:30    


 


Urine WBC  2-5 /hpf (0-5)   19  20:30    


 


Ur Epithelial Cells  RARE /lpf (FEW)   19  20:30    


 


Amorphous Sediment  FEW URATES  (NONE SEEN)   19  20:30    


 


Urine Bacteria  FEW /hpf (NONE SEEN)   19  20:30    


 


Urine Yeast  FEW /hpf (NONE SEEN)  H  19  20:30    


 


Ur Random Sodium  28 mmol/L  19  15:00    


 


Urine Creatinine  22.0 mg/dl (28.0-217.0)  L  19  15:00    


 


Microalb/Creat Ratio  291.5 mg/g creat (0.0-30.0)  H  19  15:00    














- Physical Exam


Vitals and I&O: 


 Vital Signs











Temp  98.9 F   19 12:00


 


Pulse  81   19 13:23


 


Resp  16   19 12:00


 


BP  90/36   19 12:00


 


Pulse Ox  100   19 13:23








 Intake & Output











 19





 18:59 06:59 18:59


 


Intake Total 3361.367 1692.5 622.5


 


Output Total 1300 1100 


 


Balance 2061.367 592.5 622.5


 


Weight (lbs) 67.222 kg 67.585 kg 


 


Intake:   


 


  Intake, IV Amount 2261.367 1192.5 622.5


 


    Piperacillin Sodium/ 100 100 50





    Tazobact 2.25 gm In   





    Sodium Chloride 0.9% 50   





    ml @ 100 mls/hr IV Q6HR   





    [facility] Rx#:[id number]   


 


    Potassium Chloride 40 meq 253.867  





    Lidocaine 1% 20mL Vial   





    25 mg In Sodium Chloride   





    0.9% 250 ml @ 68 mls/hr   





    IV X1 ONE Rx#:[id number]   


 


    Sodium Chloride 0.9% 1, 1907.5 1092.5 572.5





    000 ml @ 150 mls/hr IV .   





    Q6H40M [facility] Rx#:[id number]   


 


  Oral 0  


 


  Tube Feeding 720 300 


 


  Other 380 200 


 


Output:   


 


  Urine 1300 1100 


 


Other:   


 


  # Bowel Movements 2 3 


 


  Stool Characteristics Soft Liquid Liquid





 Green Brown Brown


 


  Weight Source Bedscale Bedscale 











Active Medications: 


Current Medications





Acetaminophen (Tylenol)  650 mg PO Q4HR PRN


   PRN Reason: Pain or Fever >101


   Stop: 19 10:22


Albuterol Sulfate (Albuterol 2.5mg/3ml Neb Ud)  2.5 mg HHN Q2HR PRN


   PRN Reason: Shortness of Breath


   Stop: 19 10:22


   Last Admin: 19 13:22 Dose:  2.5 mg


Allopurinol (Zyloprim)  300 mg GT DAILY [facility]


   Stop: 19 08:59


   Last Admin: 19 09:25 Dose:  Not Given


Ascorbic Acid (Vitamin C)  500 mg GT DAILY [facility]


   Stop: 19 08:59


   Last Admin: 19 09:25 Dose:  Not Given


Chlorhexidine Gluconate (Peridex)  15 ml MM 0800,2000 [facility]


   Stop: 19 08:59


   Last Admin: 19 09:23 Dose:  15 ml


Docusate Sodium (Colace)  200 mg GT HS [facility]


   Stop: 19 20:59


   Last Admin: 19 21:45 Dose:  Not Given


Epoetin Azael (Epogen)  3,000 units SUBQ MWF@1600 [facility]


   Stop: 19 15:59


   Last Admin: 19 16:57 Dose:  3,000 units


Ferrous Sulfate (Iron)  7.5 mg GT DAILY [facility]


   Stop: 19 08:59


   Last Admin: 19 09:26 Dose:  Not Given


Sodium Chloride (Nacl 0.9%)  1,000 mls @ 150 mls/hr IV .Q6H40M [facility]


   Stop: 19 22:59


   Last Admin: 19 09:27 Dose:  150 mls/hr


Piperacillin Sod/Tazobactam (Sod 2.25 gm/ Sodium Chloride)  50 mls @ 100 mls/hr 

IV Q6HR [facility]


   Stop: 19 00:00


   Last Infusion: 19 12:35 Dose:  Infused


Insulin Aspart (Novolog Insulin Sliding Scale)  0 units SUBQ Q6HR [facility]; Protocol


   Stop: 19 00:00


   Last Admin: 19 12:04 Dose:  2 units


Insulin Detemir (Levemir Insulin)  35 units SUBQ BID [facility]; Protocol


   Stop: 19 16:59


   Last Admin: 19 09:20 Dose:  35 units


Lactulose (Cephulac)  20 gm GT BID [facility]


   Stop: 19 16:59


   Last Admin: 19 09:26 Dose:  Not Given


Levothyroxine Sodium (Synthroid)  0.125 mg GT QDAC [facility]


   Stop: 19 07:29


   Last Admin: 19 06:51 Dose:  Not Given


Miscellaneous (Vte Chemical Prophylaxis Screen/ Admission)  1 ea [facility] PRN PRN


   PRN Reason: PROTOCOL


   Stop: 19 15:55


Multivitamins/Minerals (Theragran M)  15 ml GT DAILY [facility]


   Stop: 19 08:59


   Last Admin: 19 09:26 Dose:  Not Given


Ondansetron HCl (Zofran Odt)  4 mg PO Q6HR PRN


   PRN Reason: Nausea


Pantoprazole Sodium (Protonix)  40 mg IVP DAILY [facility]


   Stop: 19 08:59


   Last Admin: 19 09:19 Dose:  40 mg


Sevelamer Carbonate (Renvela)  800 mg PO TID [facility]


   Stop: 19 13:59


   Last Admin: 19 13:23 Dose:  800 mg


Vitamin B Complex/Vit C/Folic Acid (Vitamin B Complex W/Vitamin C)  1 tab GT 

DAILY [facility]


   Stop: 19 08:59


   Last Admin: 19 09:26 Dose:  Not Given








General: No acute distress


HEENT: Atraumatic, Mucous membr. moist/pink


Neck: Supple, +2 carotid pulse wo bruit


Cardiovascular: Regular rate, Normal S1, Normal S2


Lungs: Normal air movement


Abdomen: Bowel sounds, Soft, Hepatomegaly


Extremities: no Edema


Neurological: Sensation intact


Skin: no Rash


Psych/Mental Status: Mood NL





- Procedures


Procedures: 


 Procedures











Procedure Code Date


 


RESPIRATORY VENTILATION, 24-96 CONSECUTIVE HOURS [id number] 19














Assessment/Plan





- Assessment


Assessment: 





JAMES on CKD


RFVD


Electrolyte Imbalance


Sepsei 2/2 Cx UTI


Chronic Liver Failure 2/2 NAFLD


Dyslipidemia


Ess Htn


T2DM


GB Stones











- Plan


Plan: 


Lab - Result Diagrams





 19 04:51 





 19 04:51 





Current Medications





Acetaminophen (Tylenol)  650 mg PO Q4HR PRN


   PRN Reason: Pain or Fever >101


   Stop: 19 10:22


Albuterol Sulfate (Albuterol 2.5mg/3ml Neb Ud)  2.5 mg HHN Q2HR PRN


   PRN Reason: Shortness of Breath


   Stop: 19 10:22


   Last Admin: 19 13:22 Dose:  2.5 mg


Allopurinol (Zyloprim)  300 mg GT DAILY [facility]


   Stop: 19 08:59


   Last Admin: 19 09:25 Dose:  Not Given


Ascorbic Acid (Vitamin C)  500 mg GT DAILY [facility]


   Stop: 19 08:59


   Last Admin: 19 09:25 Dose:  Not Given


Chlorhexidine Gluconate (Peridex)  15 ml MM 0800,2000 [facility]


   Stop: 19 08:59


   Last Admin: 19 09:23 Dose:  15 ml


Docusate Sodium (Colace)  200 mg GT HS [facility]


   Stop: 19 20:59


   Last Admin: 19 21:45 Dose:  Not Given


Epoetin Azael (Epogen)  3,000 units SUBQ MWF@1600 [facility]


   Stop: 19 15:59


   Last Admin: 19 16:57 Dose:  3,000 units


Ferrous Sulfate (Iron)  7.5 mg GT DAILY [facility]


   Stop: 19 08:59


   Last Admin: 19 09:26 Dose:  Not Given


Sodium Chloride (Nacl 0.9%)  1,000 mls @ 150 mls/hr IV .Q6H40M [facility]


   Stop: 19 22:59


   Last Admin: 19 09:27 Dose:  150 mls/hr


Piperacillin Sod/Tazobactam (Sod 2.25 gm/ Sodium Chloride)  50 mls @ 100 mls/hr 

IV Q6HR [facility]


   Stop: 19 00:00


   Last Infusion: 19 12:35 Dose:  Infused


Insulin Aspart (Novolog Insulin Sliding Scale)  0 units SUBQ Q6HR [facility]; Protocol


   Stop: 19 00:00


   Last Admin: 19 12:04 Dose:  2 units


Insulin Detemir (Levemir Insulin)  35 units SUBQ BID [facility]; Protocol


   Stop: 19 16:59


   Last Admin: 19 09:20 Dose:  35 units


Lactulose (Cephulac)  20 gm GT BID [facility]


   Stop: 19 16:59


   Last Admin: 19 09:26 Dose:  Not Given


Levothyroxine Sodium (Synthroid)  0.125 mg GT QDAC [facility]


   Stop: 19 07:29


   Last Admin: 19 06:51 Dose:  Not Given


Miscellaneous (Vte Chemical Prophylaxis Screen/ Admission)  1 [facility] PRN PRN


   PRN Reason: PROTOCOL


   Stop: 19 15:55


Multivitamins/Minerals (Theragran M)  15 ml GT DAILY [facility]


   Stop: 19 08:59


   Last Admin: 19 09:26 Dose:  Not Given


Ondansetron HCl (Zofran Odt)  4 mg PO Q6HR PRN


   PRN Reason: Nausea


Pantoprazole Sodium (Protonix)  40 mg IVP DAILY [facility]


   Stop: 19 08:59


   Last Admin: 19 09:19 Dose:  40 mg


Sevelamer Carbonate (Renvela)  800 mg PO TID [facility]


   Stop: 19 13:59


   Last Admin: 19 13:23 Dose:  800 mg


Vitamin B Complex/Vit C/Folic Acid (Vitamin B Complex W/Vitamin C)  1 tab GT 

DAILY [facility]


   Stop: 19 08:59


   Last Admin: 19 09:26 Dose:  Not Given





Lab - Result Diagrams





 19 04:51 





 19 04:51 





Kidney fnc better w/ BUN/CR of 79/1.4


good UOP


FENa 1.25% suggestive of intrinsic kidney failure


replace P04


continue IVF


f/u electrolytes, cbc





Nutritional Asmnt/Malnutr-PDOC





- Dietary Evaluation


Malnutrition Findings (Please click <Entered> for more info): 








Nutritional Asmnt/Malnutrition                             Start:  19 13:

11


Text:                                                      Status: Active      

  


Freq:                                                                          

  


Protocol:                                                                      

  


 Document     19 13:11  [id number]  (Rec: 19 13:29  [id number]  REMEDIOS)


 Nutritional Asmnt/Malnutrition


     Patient General Information


      Nutritional Screening                      High Risk


      Diagnosis                                  sepsis, hyponatremia,


                                                 hypokalemia, UTI & dehydration


      Pertinent Medical Hx/Surgical Hx           COPD, resp failure on vent via


                                                 trach, Gtube, sepsis,


                                                 dysphagia, hepatic failure,


                                                 anemia, edema, cardiomyopathy,


                                                 AFIb, TIA/stroke, gout,


                                                 hypothyroidism, CKD


      Subjective Information                     Pt was seen resting in bed at


                                                 time of visit, glucerna 1.2


                                                 running at 60ml/hr. Renal


                                                 doctor has been consulted for


                                                 elevated BUN/Cr levels per RN.


                                                 Previous tube feeding order


                                                 found in chart was glucerna 1.


                                                 5 @60ml/hr continuous noted.


                                                 Will continue with glucerna 1.


                                                 2 at 60ml/hr continuous as it


                                                 meets 100% of nutrition needs,


                                                 providing 1728kcal and 86g


                                                 protein.


      Current Diet Order/ Nutrition Support      glucerna 1.2 @ 60ml/hr


                                                 continuous


      Pertinent Medications                      vit c, colace, epogen, iron,


                                                 novolog, levemir, lactulose,


                                                 synthroid, theragran, zofran,


                                                 protonix, piperacillin,


                                                 renvela, NaCl 0.9%, vit b


                                                 complex w/vit c


      Pertinent Labs                              Na 132, K 3.2, Cl 89, BUN


                                                 125, Cr 1.6, glucose 238, Mg


                                                 3.1, Triglycerides 813,


                                                 cholesterol 397, -272


                                                  Na 125, K 3.2, Cl 76, BUN


                                                 151, Cr 1.9, Glucose 237, Alb


                                                 4.0


     Nutritional Hx/Data


      Height                                     1.65 m


      Height (Calculated Centimeters)            165.1


      Current Weight (lbs)                       67.132 kg


      Weight (Calculated Kilograms)              67.1


      Weight (Calculated Grams)                  18004.7


      Ideal Body Weight                          125


      Body Mass Index (BMI)                      24.6


      Weight Status                              Approriate


     GI Symptoms


      GI Symptoms                                None


      Last BM                                    


      Food Allergies                             No


      Skin Integrity/Comment:                    reddened buttocks, darren 11


     Estimated Nutritional Goals


      BEE in Kcals:                              Using Current wt


      Calories/Kcals/Kg                          25-30


      Kcals Calculated                           


      Protein:                                   Using Current wt


      Protein g/k monitor renal labs


      Protein Calculated                         67


      Fluid: ml                                   (1ml/kcal)


     Nutritional Problem


      1. Problem


       Problem                                   altered nutrition related labs


       Etiology                                  renal dysfunction,


                                                 hyperglycemia, fluid/


                                                 electrolyte imbalance


       Signs/Symptoms:                           Na 132, K 3.2, Cl 89, ,


                                                 Cr 1.6, glucose 238, -


                                                 272


     Malnutrition Alert


      Is there a minimum of two criteria         No


       selected?                                 


       Query Text:Check all the applicable       


       criteria. A minimum of two criteria are   


       recommended for diagnosis of either       


       severe or non-severe malnutrition.        


     Malnutrition Related to Morbid Obesity


      Malnutrition related to morbid obesity     No


     Intervention/Recommendation


      Comments                                   1. Continue with glucerna 1.2


                                                 @60ml/hr continous. It


                                                 provides 1728kcal, 86g protein


                                                 , 1159ml free water, meeting


                                                 100% of nutritional needs.


                                                 Monitor renal labs. If BUN/


                                                 Crea continue elevated, will


                                                 consider decrease protein


                                                 intake.


                                                 2. Monitor TF rate, tolerance,


                                                 wt, skin integrity and labs


                                                 3. F/U as high risk in 2-3


                                                 days


     Expected Outcomes/Goals


      Expected Outcomes/Goals                    1. Pt to meet at least 75% of


                                                 nutritional needs via


                                                 nutrition support with


                                                 tolerance


                                                 2. Wt stability, skin to


                                                 remain intact, labs to


                                                 approach WNL.


                                                 Reviewed by Juanita Lewis RD

## 2019-01-24 NOTE — HISTORY & PHYSICAL
ADMIT DATE:  01/23/2019



CHIEF COMPLAINT:  Abnormal labs.



HISTORY OF PRESENT ILLNESS:  This is a 68-year-old female with a subacute

resident, admitted to the ICU unit due to abnormal labs of , potassium is

3.2.  The patient was also noted to have an elevated white count, also lactic

acid was elevated in the ER; therefore, sepsis protocol was initiated.



PAST MEDICAL HISTORY:  Pneumonia effusion, VDRF, respiratory failure, chronic

kidney disease, hypertension, hypertensive heart disease, hypothyroidism, type 2

diabetes, diabetic neuropathy, anemia of chronic disease, COPD, dementia,

cardiomyopathy, gout, protein-calorie malnutrition, AFib, vitamin D deficiency,

TIA, CVA.



SURGICAL HISTORY:  PEG and trach.



ALLERGIES:  No drug allergies.



MEDICATIONS:  Please see medication sheet.



FAMILY HISTORY:  Noncontributory.



REVIEW OF SYSTEMS:  Unable to obtain, the patient is nonverbal.



PHYSICAL EXAMINATION:

GENERAL:  Thin elderly female, appears chronically ill, in no apparent distress.

VITAL SIGNS:  Temperature 98.0, heart rate 73, blood pressure 119/59, O2 of 99%.

HEENT:  Head; normocephalic, atraumatic.

NECK:  Supple.  No mass.

LUNGS:  Rhonchi bilaterally.

HEART:  Regular rhythm.

ABDOMEN:  Soft, nontender, nondistended.

EXTREMITIES:  No edema noted.



LABORATORY DATA:  WBC 11.1, H and H 8.3 and 24.3, platelet of 343.  Sodium 142,

potassium 4.2, chloride 106, BUN 79, creatinine 1.4, lactic acid 2.72.



DIAGNOSTICS:  The patient had a chest x-ray, impression is poor inspiration,

noted to have acute focal parenchymal disease.



ASSESSMENT:  Lactic acidosis, rule out sepsis, pneumonia, type 2 diabetes,

ventilator dependent respiratory failure, acute on chronic respiratory failure,

acute on chronic renal failure, chronic liver disease, hypertension,

hypothyroidism, type 2 diabetes, chronic obstructive pulmonary disease,

dementia, CVA, transient ischemic attack.



PLAN:  The patient to be admitted to the ICU unit.  We will get Infectious

Disease, Pulmonology, ID on the case.  We will continue the patient on sepsis

protocol, antibiotics.  We will get followup labs for tomorrow morning.  We will

continue to monitor this patient.





DD: 01/24/2019 19:23

DT: 01/24/2019 20:47

JOB# [id number]  [id number]

## 2019-01-24 NOTE — DIAGNOSTIC IMAGING REPORT
Exam: Ultrasound summation abdomen.



HISTORY: Ascites, liver failure



Exam: None



Findings:



Real-time ultrasound examination abdomen performed the planes.  The

study is limited due to patient body habitus and large amount of

intra-abdominal bowel gas.



The liver parenchyma is intact.  There is evidence for cholelithiasis

multiple calculi in the most dependent portion of gallbladder.



Common bile duct measures 4.6 mm.  Pancreas is not seen.  There is no

evidence for obstructive uropathy or nephrolithiasis.  Right kidney

measures 11.2 x 4.5 x 8.3 cm in diameter.  The left kidney measures 11.9

x 5.9 x 7.1 mm diameter.



The spleen is enlarged.  No free fluid is noted.



IMPRESSION: Extremely limited examination due to patient body habitus

and large amount of intra-abdominal bowel gas



Cholelithiasis.

## 2019-01-25 LAB
ANION GAP SERPL CALC-SCNC: 17.6 MMOL/L (ref 7–16)
BASOPHILS # BLD AUTO: 0 TH/CUMM (ref 0–0.2)
BASOPHILS NFR BLD AUTO: 0.4 % (ref 0–2)
BASOPHILS NFR BLD: 0.4 % (ref 0–3)
BUN SERPL-MCNC: 52 MG/DL (ref 7–25)
CALCIUM SERPL-MCNC: 8.2 MG/DL (ref 8.6–10.3)
CHLORIDE SERPL-SCNC: 116 MEQ/L (ref 98–107)
CO2 SERPL-SCNC: 19.4 MEQ/L (ref 21–31)
CREAT SERPL-MCNC: 1.3 MG/DL (ref 0.6–1.2)
EOSINOPHIL # BLD AUTO: 0.4 TH/CMM (ref 0.1–0.4)
EOSINOPHIL NFR BLD AUTO: 6.2 % (ref 0–5)
EOSINOPHIL NFR BLD: 6.2 % (ref 0–5)
ERYTHROCYTE [DISTWIDTH] IN BLOOD BY AUTOMATED COUNT: 16.8 % (ref 11.5–20)
GLUCOSE SERPL-MCNC: 197 MG/DL (ref 70–105)
HCT VFR BLD CALC: 24 % (ref 41–60)
HGB BLD-MCNC: 8.1 GM/DL (ref 12–16)
LYMPHOCYTE AB SER FC-ACNC: 0.4 TH/CMM (ref 1.5–3)
LYMPHOCYTES # BLD MANUAL: 7.2 % (ref 20–50)
LYMPHOCYTES NFR BLD AUTO: 7.2 % (ref 20–50)
MCH RBC QN AUTO: 34.1 PG (ref 27–31)
MCHC RBC AUTO-ENTMCNC: 33.5 PG (ref 28–36)
MCV RBC AUTO: 101.7 FL (ref 81–100)
MONOCYTES # BLD AUTO: 0.3 TH/CMM (ref 0.3–1)
MONOCYTES # BLD MANUAL: 4.7 % (ref 2–10)
MONOCYTES NFR BLD AUTO: 4.7 % (ref 2–10)
NEUTROPHILS # BLD: 5.1 TH/CMM (ref 1.8–8)
NEUTROPHILS NFR BLD AUTO: 81.5 % (ref 40–80)
NEUTROPHILS NFR BLD AUTO: 81.5 % (ref 40–80)
PHOSPHATE SERPL-MCNC: 2 MG/DL (ref 2.5–5)
PLATELET # BLD: 325 TH/CMM (ref 150–400)
PMV BLD AUTO: 6.6 FL
POTASSIUM SERPL-SCNC: 5 MEQ/L (ref 3.5–5.1)
RBC # BLD AUTO: 2.36 MIL/CMM (ref 3.8–5.2)
SODIUM SERPL-SCNC: 148 MEQ/L (ref 136–145)
WBC # BLD AUTO: 10.5 TH/CMM (ref 4.8–10.8)

## 2019-01-25 RX ADMIN — ALBUTEROL SULFATE PRN MG: 2.5 SOLUTION RESPIRATORY (INHALATION) at 00:42

## 2019-01-25 RX ADMIN — INSULIN DETEMIR SCH UNITS: 100 INJECTION, SOLUTION SUBCUTANEOUS at 17:14

## 2019-01-25 RX ADMIN — EPOETIN ALFA SCH UNITS: 20000 SOLUTION INTRAVENOUS; SUBCUTANEOUS at 17:13

## 2019-01-25 RX ADMIN — ALBUTEROL SULFATE PRN MG: 2.5 SOLUTION RESPIRATORY (INHALATION) at 14:41

## 2019-01-25 RX ADMIN — LACTULOSE SCH: 20 SOLUTION ORAL at 17:16

## 2019-01-25 RX ADMIN — Medication SCH ML: at 08:39

## 2019-01-25 RX ADMIN — LEVOTHYROXINE SODIUM SCH MG: 125 TABLET ORAL at 08:38

## 2019-01-25 RX ADMIN — SODIUM CHLORIDE SCH MLS/HR: 9 INJECTION, SOLUTION INTRAVENOUS at 12:14

## 2019-01-25 RX ADMIN — INSULIN ASPART SCH UNITS: 100 INJECTION, SOLUTION INTRAVENOUS; SUBCUTANEOUS at 12:13

## 2019-01-25 RX ADMIN — LACTULOSE SCH: 20 SOLUTION ORAL at 08:41

## 2019-01-25 RX ADMIN — SODIUM CHLORIDE SCH MLS/HR: 9 INJECTION, SOLUTION INTRAVENOUS at 23:57

## 2019-01-25 RX ADMIN — SODIUM CHLORIDE SCH MLS/HR: 9 INJECTION, SOLUTION INTRAVENOUS at 05:58

## 2019-01-25 RX ADMIN — ALBUTEROL SULFATE PRN MG: 2.5 SOLUTION RESPIRATORY (INHALATION) at 07:16

## 2019-01-25 RX ADMIN — INSULIN DETEMIR SCH UNITS: 100 INJECTION, SOLUTION SUBCUTANEOUS at 08:40

## 2019-01-25 RX ADMIN — ALBUTEROL SULFATE PRN MG: 2.5 SOLUTION RESPIRATORY (INHALATION) at 10:37

## 2019-01-25 RX ADMIN — INSULIN ASPART SCH UNITS: 100 INJECTION, SOLUTION INTRAVENOUS; SUBCUTANEOUS at 05:58

## 2019-01-25 RX ADMIN — DOCUSATE SODIUM SCH: 50 LIQUID ORAL at 20:27

## 2019-01-25 RX ADMIN — INSULIN ASPART SCH UNITS: 100 INJECTION, SOLUTION INTRAVENOUS; SUBCUTANEOUS at 17:13

## 2019-01-25 RX ADMIN — SODIUM CHLORIDE SCH MLS/HR: 9 INJECTION, SOLUTION INTRAVENOUS at 18:00

## 2019-01-25 RX ADMIN — CHLORHEXIDINE GLUCONATE SCH ML: 1.2 RINSE ORAL at 19:22

## 2019-01-25 RX ADMIN — Medication SCH TAB: at 08:39

## 2019-01-25 RX ADMIN — CHLORHEXIDINE GLUCONATE SCH ML: 1.2 RINSE ORAL at 08:40

## 2019-01-25 NOTE — INTERNAL MEDICINE PROG NOTE
Internal Medicine Subjective





- Subjective


Patient seen and examined:: chart reviewed


Patient is:: asleep


Per staff patient has:: no adverse event





Internal Medicine Objective





- Results


Result Diagrams: 


 19 09:27





 19 09:27


Recent Labs: 


 Laboratory Last Values











WBC  10.5 Th/cmm (4.8-10.8)   19  04:00    


 


RBC  2.36 Mil/cmm (3.80-5.20)  L  19  04:00    


 


Hgb  8.1 gm/dL (12-16)  L  19  04:00    


 


Hct  24.0 % (41.0-60)  L  19  04:00    


 


MCV  101.7 fl ()  H  19  04:00    


 


MCH  34.1 pg (27.0-31.0)  H  19  04:00    


 


MCHC Differential  33.5 pg (28.0-36.0)   19  04:00    


 


RDW  16.8 % (11.5-20.0)   19  04:00    


 


Plt Count  325 Th/cmm (150-400)   19  04:00    


 


MPV  6.6 fl  19  04:00    


 


Add Manual Diff  YES   19  04:51    


 


Neutrophils %  81.5 % (40.0-80.0)  H  19  04:00    


 


Band Neutrophils %  0 % (0-10)   19  04:51    


 


Lymphocytes %  7.2 % (20.0-50.0)  L  19  04:00    


 


Monocytes %  4.7 % (2.0-10.0)   19  04:00    


 


Eosinophils %  6.2 % (0.0-5.0)  H  19  04:00    


 


Basophils %  0.4 % (0.0-2.0)   19  04:00    


 


Neutrophils (Manual)  81.5 % (40-80)  H  19  04:00    


 


Lymphocytes  7.2 % (20-50)  L  19  04:00    


 


Monocytes  4.7 % (2-10)   19  04:00    


 


Eosinophils  6.2 % (0-5)  H  19  04:00    


 


Basophils  0.4 % (0-3)   19  04:00    


 


Eos Smear Source  URINE   19  15:00    


 


Eos Smear Total Cells  NONE SEEN  (NONE SEEN)   19  15:00    


 


Specimen Source  Arterial   19  09:05    


 


Sample Site  LB   19  09:05    


 


pH  7.47  (7.35-7.45)  H  19  09:05    


 


pCO2  39.0 mmHg (35.0-45.0)   19  09:05    


 


pO2  104.0 mmHg (80.0-100.0)  H  19  09:05    


 


HCO3  28.4 mEq/L (20.0-26.0)  H  19  09:05    


 


Base Excess  4.4 mEq/L (-3.0-3.0)  H  19  09:05    


 


O2 Saturation  98.0 % (92.0-100.0)   19  09:05    


 


Vent Rate  16   19  09:05    


 


Inspired O2  28   19  09:05    


 


Tidal Volume  450   19  09:05    


 


PEEP  5   19  09:05    


 


Critical Value  PW   19  09:05    


 


Sodium  148 mEq/L (136-145)  H  19  04:00    


 


Potassium  5.0 mEq/L (3.5-5.1)   19  04:00    


 


Chloride  116 mEq/L ()  H  19  04:00    


 


Carbon Dioxide  19.4 mEq/L (21.0-31.0)  L  19  04:00    


 


Anion Gap  17.6  (7.0-16.0)  H  19  04:00    


 


BUN  52 mg/dL (7-25)  H  19  04:00    


 


Creatinine  1.3 mg/dL (0.6-1.2)  H  19  04:00    


 


Est GFR ( Amer)  52.7 ml/min (>90)   19  04:00    


 


Est GFR (Non-Af Amer)  43.6 ml/min  19  04:00    


 


BUN/Creatinine Ratio  40.0   19  04:00    


 


Glucose  197 mg/dL ()  H  19  04:00    


 


POC Glucose  202 MG/DL (70 - 105)  H  19  11:49    


 


Plasma/Ser Osmolality  342 mOsmol/kg (280-301)  H  19  15:00    


 


Whole Bld Lactic Acid  2.60 mmol/L (0.60-1.99)  H*  19  06:00    


 


Uric Acid  6.3 mg/dL (2.3-6.6)   19  04:51    


 


Calcium  8.2 mg/dL (8.6-10.3)  L  19  04:00    


 


Phosphorus  2.0 mg/dL (2.5-5.0)  L  19  04:00    


 


Magnesium  2.9 mg/dL (1.9-2.7)  H  19  04:51    


 


Total Bilirubin  0.8 mg/dL (0.3-1.0)   19  04:51    


 


AST  69 U/L (13-39)  H  19  04:51    


 


ALT  89 U/L (7-52)  H  19  04:51    


 


Alkaline Phosphatase  99 U/L ()   19  04:51    


 


Ammonia  38 umol/L (16-53)   19  08:10    


 


Troponin I  0.05 ng/mL (0.01-0.05)   19  20:15    


 


B-Natriuretic Peptide  146.0 pg/mL (5.0-100.0)  H  19  04:25    


 


Total Protein  6.7 gm/dL (6.0-8.3)   19  04:51    


 


Albumin  3.2 gm/dL (3.7-5.3)  L  19  04:51    


 


Globulin  3.5 gm/dL  19  04:51    


 


Albumin/Globulin Ratio  0.9  (1.0-1.8)  L  19  04:51    


 


Triglycerides  813 mg/dL (<150)  H  19  04:25    


 


Cholesterol  397 mg/dL (<200)  H  19  04:25    


 


LDL Cholesterol Direct  187 mg/dL ()   19  04:25    


 


HDL Cholesterol  34 mg/dL (23-92)   19  04:25    


 


Lipase  174 U/L (11-82)  H  19  04:51    


 


TSH  3.28 uIU/ml (0.34-5.60)   19  04:25    


 


Urine Source  CLEAN C   19  20:30    


 


Urine Color  YELLOW   19  20:30    


 


Urine Clarity  HAZY  (CLEAR)   19  20:30    


 


Urine pH  5.5  (4.6 - 8.0)   19  20:30    


 


Ur Specific Gravity  1.010  (1.005-1.030)   19  20:30    


 


Urine Protein  NEGATIVE mg/dL (NEGATIVE)   19  20:30    


 


Urine Glucose (UA)  NEGATIVE mg/dL (NEGATIVE)   19  20:30    


 


Urine Ketones  NEGATIVE mg/dL (NEGATIVE)   19  20:30    


 


Urine Blood  NEGATIVE  (NEGATIVE)   19  20:30    


 


Urine Nitrate  NEGATIVE  (NEGATIVE)   19  20:30    


 


Urine Bilirubin  NEGATIVE  (NEGATIVE)   19  20:30    


 


Urine Urobilinogen  0.2 E.U./dL (0.2 - 1.0)   19  20:30    


 


Ur Leukocyte Esterase  SMALL  (NEGATIVE)  H  19  20:30    


 


Urine RBC  5-10 /hpf (0-5)  H  19  20:30    


 


Urine WBC  2-5 /hpf (0-5)   19  20:30    


 


Ur Epithelial Cells  RARE /lpf (FEW)   19  20:30    


 


Amorphous Sediment  FEW URATES  (NONE SEEN)   19  20:30    


 


Urine Bacteria  FEW /hpf (NONE SEEN)   19  20:30    


 


Urine Yeast  FEW /hpf (NONE SEEN)  H  19  20:30    


 


Ur Random Sodium  28 mmol/L  19  15:00    


 


Urine Creatinine  22.0 mg/dl (28.0-217.0)  L  19  15:00    


 


Microalb/Creat Ratio  291.5 mg/g creat (0.0-30.0)  H  19  15:00    














- Physical Exam


Vitals and I&O: 


 Vital Signs











Temp  97.7 F   19 12:00


 


Pulse  82   19 16:00


 


Resp  22   19 16:00


 


BP  139/70   19 16:00


 


Pulse Ox  100   19 16:00








 Intake & Output











 19





 18:59 06:59 18:59


 


Intake Total 1672.5 2605 


 


Output Total  2550 


 


Balance 1672.5 55 


 


Weight (lbs)  67.585 kg 


 


Intake:   


 


  Intake, IV Amount 1672.5 1100 


 


    Piperacillin Sodium/ 100 100 





    Tazobact 2.25 gm In   





    Sodium Chloride 0.9% 50   





    ml @ 100 mls/hr IV Q6HR   





    [facility] Rx#:[id number]   


 


    Sodium Chloride 0.9% 1, 1572.5 1000 





    000 ml @ 150 mls/hr IV .   





    Q6H40M [facility] Rx#:[id number]   


 


  Tube Feeding  1045 


 


  Other  460 


 


Output:   


 


  Urine  2550 


 


Other:   


 


  # Bowel Movements  2 


 


  Stool Characteristics Liquid Liquid Liquid





 Brown Brown Brown


 


  Weight Source  Bedscale 











Active Medications: 


Current Medications





Acetaminophen (Tylenol)  650 mg PO Q4HR PRN


   PRN Reason: Pain or Fever >101


   Stop: 19 10:22


Albuterol Sulfate (Albuterol 2.5mg/3ml Neb Ud)  2.5 mg HHN Q2HR PRN


   PRN Reason: Shortness of Breath


   Stop: 19 10:22


   Last Admin: 19 14:41 Dose:  2.5 mg


Allopurinol (Zyloprim)  100 mg GT DAILY [facility]


   Stop: 19 08:59


   Last Admin: 19 08:39 Dose:  100 mg


Ascorbic Acid (Vitamin C)  500 mg GT DAILY [facility]


   Stop: 19 08:59


   Last Admin: 19 08:39 Dose:  500 mg


Chlorhexidine Gluconate (Peridex)  15 ml MM 0800, [facility]


   Stop: 19 08:59


   Last Admin: 19 08:40 Dose:  15 ml


Docusate Sodium (Colace)  200 mg GT HS [facility]


   Stop: 19 20:59


   Last Admin: 19 20:35 Dose:  Not Given


Epoetin Azael (Epogen)  3,000 units SUBQ MWF@1600 [facility]


   Stop: 19 15:59


   Last Admin: 19 16:57 Dose:  3,000 units


Ferrous Sulfate (Iron)  7.5 mg GT DAILY [facility]


   Stop: 19 08:59


   Last Admin: 19 08:39 Dose:  7.5 mg


Piperacillin Sod/Tazobactam (Sod 2.25 gm/ Sodium Chloride)  50 mls @ 100 mls/hr 

IV Q6HR [facility]


   Stop: 19 00:00


   Last Admin: 19 12:14 Dose:  100 mls/hr


Dextrose (D5w)  1,000 mls @ 75 mls/hr IV .[id number] [facility]


   Stop: 19 06:29


   Last Admin: 19 06:32 Dose:  75 mls/hr


Insulin Aspart (Novolog Insulin Sliding Scale)  0 units SUBQ Q6HR [facility]; Protocol


   Stop: 19 00:00


   Last Admin: 19 12:13 Dose:  4 units


Insulin Detemir (Levemir Insulin)  35 units SUBQ BID [facility]; Protocol


   Stop: 19 16:59


   Last Admin: 19 08:40 Dose:  35 units


Lactulose (Cephulac)  20 gm GT BID [facility]


   Stop: 19 16:59


   Last Admin: 19 08:41 Dose:  Not Given


Levothyroxine Sodium (Synthroid)  0.125 mg GT QDAC [facility]


   Stop: 19 07:29


   Last Admin: 19 08:38 Dose:  0.125 mg


Miscellaneous (Vte Chemical Prophylaxis Screen/ Admission)  1 ea MC PRN PRN


   PRN Reason: PROTOCOL


   Stop: 19 15:55


Multivitamins/Minerals (Theragran M)  15 ml GT DAILY [facility]


   Stop: 19 08:59


   Last Admin: 19 08:39 Dose:  15 ml


Mupirocin (Bactroban Oint)  1 appl NS BID [facility]


   Stop: 19 09:01


   Last Admin: 19 08:40 Dose:  1 appl


Ondansetron HCl (Zofran Odt)  4 mg PO Q6HR PRN


   PRN Reason: Nausea


Pantoprazole Sodium (Protonix)  40 mg IVP DAILY [facility]


   Stop: 19 08:59


   Last Admin: 19 08:39 Dose:  40 mg


Vitamin B Complex/Vit C/Folic Acid (Vitamin B Complex W/Vitamin C)  1 tab GT 

DAILY [facility]


   Stop: 19 08:59


   Last Admin: 19 08:39 Dose:  1 tab








General: weak, lethargic


HEENT: NC/AT


Neck: Supple


Lungs: ronchi


Cardiovascular: RRR, Normal S1, Normal S2


Abdomen: soft, non-tender


Extremities: clear


Neurological: no change





- Procedures


Procedures: 


 Procedures











Procedure Code Date


 


RESPIRATORY VENTILATION, 24-96 CONSECUTIVE HOURS [id number] 19














Internal Medicine Assmt/Plan





- Assessment


Assessment: 








Leukocytosis, lactic acidosis, suspect sepsis.


Pneumonia, radiologically clear.


Diabetes mellitus type 2.


Acute renal failure on chronic renal disease, improving.


Lactic acidosis.


Respiratory failure, on ventilator.


Chronic liver disease.


Hypertension.


Hypothyroidism.


Diabetes mellitus type 2.


Chronic obstructive pulmonary disease.


Dementia.


Cerebrovascular accident, transient ischemic attack.


History of gout.





- Plan


Plan: 





as per order sheet 





Nutritional Asmnt/Malnutr-PDOC





- Dietary Evaluation


Malnutrition Findings (Please click <Entered> for more info): 








Nutritional Asmnt/Malnutrition                             Start:  19 13:

11


Text:                                                      Status: Active      

  


Freq:                                                                          

  


Protocol:                                                                      

  


 Document     19 13:11  [id number]  (Rec: 19 13:29  [id number]  REMEDIOS)


 Nutritional Asmnt/Malnutrition


     Patient General Information


      Nutritional Screening                      High Risk


      Diagnosis                                  sepsis, hyponatremia,


                                                 hypokalemia, UTI & dehydration


      Pertinent Medical Hx/Surgical Hx           COPD, resp failure on vent via


                                                 trach, Gtube, sepsis,


                                                 dysphagia, hepatic failure,


                                                 anemia, edema, cardiomyopathy,


                                                 AFIb, TIA/stroke, gout,


                                                 hypothyroidism, CKD


      Subjective Information                     Pt was seen resting in bed at


                                                 time of visit, glucerna 1.2


                                                 running at 60ml/hr. Renal


                                                 doctor has been consulted for


                                                 elevated BUN/Cr levels per RN.


                                                 Previous tube feeding order


                                                 found in chart was glucerna 1.


                                                 5 @60ml/hr continuous noted.


                                                 Will continue with glucerna 1.


                                                 2 at 60ml/hr continuous as it


                                                 meets 100% of nutrition needs,


                                                 providing 1728kcal and 86g


                                                 protein.


      Current Diet Order/ Nutrition Support      glucerna 1.2 @ 60ml/hr


                                                 continuous


      Pertinent Medications                      vit c, colace, epogen, iron,


                                                 novolog, levemir, lactulose,


                                                 synthroid, theragran, zofran,


                                                 protonix, piperacillin,


                                                 renvela, NaCl 0.9%, vit b


                                                 complex w/vit c


      Pertinent Labs                              Na 132, K 3.2, Cl 89, BUN


                                                 125, Cr 1.6, glucose 238, Mg


                                                 3.1, Triglycerides 813,


                                                 cholesterol 397, -272


                                                  Na 125, K 3.2, Cl 76, BUN


                                                 151, Cr 1.9, Glucose 237, Alb


                                                 4.0


     Nutritional Hx/Data


      Height                                     1.65 m


      Height (Calculated Centimeters)            165.1


      Current Weight (lbs)                       67.132 kg


      Weight (Calculated Kilograms)              67.1


      Weight (Calculated Grams)                  65626.7


      Ideal Body Weight                          125


      Body Mass Index (BMI)                      24.6


      Weight Status                              Approriate


     GI Symptoms


      GI Symptoms                                None


      Last BM                                    


      Food Allergies                             No


      Skin Integrity/Comment:                    reddened buttocks, darren 11


     Estimated Nutritional Goals


      BEE in Kcals:                              Using Current wt


      Calories/Kcals/Kg                          25-30


      Kcals Calculated                           


      Protein:                                   Using Current wt


      Protein g/k monitor renal labs


      Protein Calculated                         67


      Fluid: ml                                   (1ml/kcal)


     Nutritional Problem


      1. Problem


       Problem                                   altered nutrition related labs


       Etiology                                  renal dysfunction,


                                                 hyperglycemia, fluid/


                                                 electrolyte imbalance


       Signs/Symptoms:                           Na 132, K 3.2, Cl 89, ,


                                                 Cr 1.6, glucose 238, -


                                                 272


     Malnutrition Alert


      Is there a minimum of two criteria         No


       selected?                                 


       Query Text:Check all the applicable       


       criteria. A minimum of two criteria are   


       recommended for diagnosis of either       


       severe or non-severe malnutrition.        


     Malnutrition Related to Morbid Obesity


      Malnutrition related to morbid obesity     No


     Intervention/Recommendation


      Comments                                   1. Continue with glucerna 1.2


                                                 @60ml/hr continous. It


                                                 provides 1728kcal, 86g protein


                                                 , 1159ml free water, meeting


                                                 100% of nutritional needs.


                                                 Monitor renal labs. If BUN/


                                                 Crea continue elevated, will


                                                 consider decrease protein


                                                 intake.


                                                 2. Monitor TF rate, tolerance,


                                                 wt, skin integrity and labs


                                                 3. F/U as high risk in 2-3


                                                 days


     Expected Outcomes/Goals


      Expected Outcomes/Goals                    1. Pt to meet at least 75% of


                                                 nutritional needs via


                                                 nutrition support with


                                                 tolerance


                                                 2. Wt stability, skin to


                                                 remain intact, labs to


                                                 approach WNL.


                                                 Reviewed by Juanita Lewis RD

## 2019-01-25 NOTE — INFECTIOUS DISEASE PROG NOTE
Infectious Disease Subjective





- Review of Systems


Service Date: 19


Subjective: 





No new change..  No fever.





Infectious Disease Objective





- Results


Result Diagrams: 


 19 04:00





 19 04:00


Recent Labs: 


 Laboratory Last Values











WBC  10.5 Th/cmm (4.8-10.8)   19  04:00    


 


RBC  2.36 Mil/cmm (3.80-5.20)  L  19  04:00    


 


Hgb  8.1 gm/dL (12-16)  L  19  04:00    


 


Hct  24.0 % (41.0-60)  L  19  04:00    


 


MCV  101.7 fl ()  H  19  04:00    


 


MCH  34.1 pg (27.0-31.0)  H  19  04:00    


 


MCHC Differential  33.5 pg (28.0-36.0)   19  04:00    


 


RDW  16.8 % (11.5-20.0)   19  04:00    


 


Plt Count  325 Th/cmm (150-400)   19  04:00    


 


MPV  6.6 fl  19  04:00    


 


Add Manual Diff  YES   19  04:51    


 


Neutrophils %  81.5 % (40.0-80.0)  H  19  04:00    


 


Band Neutrophils %  0 % (0-10)   19  04:51    


 


Lymphocytes %  7.2 % (20.0-50.0)  L  19  04:00    


 


Monocytes %  4.7 % (2.0-10.0)   19  04:00    


 


Eosinophils %  6.2 % (0.0-5.0)  H  19  04:00    


 


Basophils %  0.4 % (0.0-2.0)   19  04:00    


 


Neutrophils (Manual)  81.5 % (40-80)  H  19  04:00    


 


Lymphocytes  7.2 % (20-50)  L  19  04:00    


 


Monocytes  4.7 % (2-10)   19  04:00    


 


Eosinophils  6.2 % (0-5)  H  19  04:00    


 


Basophils  0.4 % (0-3)   19  04:00    


 


Eos Smear Source  URINE   19  15:00    


 


Eos Smear Total Cells  NONE SEEN  (NONE SEEN)   19  15:00    


 


Specimen Source  Arterial   19  09:05    


 


Sample Site  LB   19  09:05    


 


pH  7.47  (7.35-7.45)  H  19  09:05    


 


pCO2  39.0 mmHg (35.0-45.0)   19  09:05    


 


pO2  104.0 mmHg (80.0-100.0)  H  19  09:05    


 


HCO3  28.4 mEq/L (20.0-26.0)  H  19  09:05    


 


Base Excess  4.4 mEq/L (-3.0-3.0)  H  19  09:05    


 


O2 Saturation  98.0 % (92.0-100.0)   19  09:05    


 


Vent Rate  16   19  09:05    


 


Inspired O2  28   19  09:05    


 


Tidal Volume  450   19  09:05    


 


PEEP  5   19  09:05    


 


Critical Value  PW   19  09:05    


 


Sodium  148 mEq/L (136-145)  H  19  04:00    


 


Potassium  5.0 mEq/L (3.5-5.1)   19  04:00    


 


Chloride  116 mEq/L ()  H  19  04:00    


 


Carbon Dioxide  19.4 mEq/L (21.0-31.0)  L  19  04:00    


 


Anion Gap  17.6  (7.0-16.0)  H  19  04:00    


 


BUN  52 mg/dL (7-25)  H  19  04:00    


 


Creatinine  1.3 mg/dL (0.6-1.2)  H  19  04:00    


 


Est GFR ( Amer)  52.7 ml/min (>90)   19  04:00    


 


Est GFR (Non-Af Amer)  43.6 ml/min  19  04:00    


 


BUN/Creatinine Ratio  40.0   19  04:00    


 


Glucose  197 mg/dL ()  H  19  04:00    


 


POC Glucose  217 MG/DL (70 - 105)  H  19  08:37    


 


Plasma/Ser Osmolality  342 mOsmol/kg (280-301)  H  19  15:00    


 


Whole Bld Lactic Acid  2.60 mmol/L (0.60-1.99)  H*  19  06:00    


 


Uric Acid  6.3 mg/dL (2.3-6.6)   19  04:51    


 


Calcium  8.2 mg/dL (8.6-10.3)  L  19  04:00    


 


Phosphorus  2.0 mg/dL (2.5-5.0)  L  19  04:00    


 


Magnesium  2.9 mg/dL (1.9-2.7)  H  19  04:51    


 


Total Bilirubin  0.8 mg/dL (0.3-1.0)   19  04:51    


 


AST  69 U/L (13-39)  H  19  04:51    


 


ALT  89 U/L (7-52)  H  19  04:51    


 


Alkaline Phosphatase  99 U/L ()   19  04:51    


 


Ammonia  38 umol/L (16-53)   19  08:10    


 


Troponin I  0.05 ng/mL (0.01-0.05)   19  20:15    


 


B-Natriuretic Peptide  146.0 pg/mL (5.0-100.0)  H  19  04:25    


 


Total Protein  6.7 gm/dL (6.0-8.3)   19  04:51    


 


Albumin  3.2 gm/dL (3.7-5.3)  L  19  04:51    


 


Globulin  3.5 gm/dL  19  04:51    


 


Albumin/Globulin Ratio  0.9  (1.0-1.8)  L  19  04:51    


 


Triglycerides  813 mg/dL (<150)  H  19  04:25    


 


Cholesterol  397 mg/dL (<200)  H  19  04:25    


 


LDL Cholesterol Direct  187 mg/dL ()   19  04:25    


 


HDL Cholesterol  34 mg/dL (23-92)   19  04:25    


 


Lipase  174 U/L (11-82)  H  19  04:51    


 


TSH  3.28 uIU/ml (0.34-5.60)   19  04:25    


 


Urine Source  CLEAN C   19  20:30    


 


Urine Color  YELLOW   19  20:30    


 


Urine Clarity  HAZY  (CLEAR)   19  20:30    


 


Urine pH  5.5  (4.6 - 8.0)   19  20:30    


 


Ur Specific Gravity  1.010  (1.005-1.030)   19  20:30    


 


Urine Protein  NEGATIVE mg/dL (NEGATIVE)   19  20:30    


 


Urine Glucose (UA)  NEGATIVE mg/dL (NEGATIVE)   19  20:30    


 


Urine Ketones  NEGATIVE mg/dL (NEGATIVE)   19  20:30    


 


Urine Blood  NEGATIVE  (NEGATIVE)   19  20:30    


 


Urine Nitrate  NEGATIVE  (NEGATIVE)   19  20:30    


 


Urine Bilirubin  NEGATIVE  (NEGATIVE)   19  20:30    


 


Urine Urobilinogen  0.2 E.U./dL (0.2 - 1.0)   19  20:30    


 


Ur Leukocyte Esterase  SMALL  (NEGATIVE)  H  19  20:30    


 


Urine RBC  5-10 /hpf (0-5)  H  19  20:30    


 


Urine WBC  2-5 /hpf (0-5)   19  20:30    


 


Ur Epithelial Cells  RARE /lpf (FEW)   19  20:30    


 


Amorphous Sediment  FEW URATES  (NONE SEEN)   19  20:30    


 


Urine Bacteria  FEW /hpf (NONE SEEN)   19  20:30    


 


Urine Yeast  FEW /hpf (NONE SEEN)  H  19  20:30    


 


Ur Random Sodium  28 mmol/L  19  15:00    


 


Urine Creatinine  22.0 mg/dl (28.0-217.0)  L  19  15:00    


 


Microalb/Creat Ratio  291.5 mg/g creat (0.0-30.0)  H  19  15:00    














- Physical Exam


Vitals and I&O: 


 Vital Signs











Temp  99.0 F   19 06:00


 


Pulse  82   19 09:16


 


Resp  16   19 06:00


 


BP  139/78   19 06:00


 


Pulse Ox  98   19 09:16








 Intake & Output











 19





 18:59 06:59 18:59


 


Intake Total 1672.5 2605 


 


Output Total  2550 


 


Balance 1672.5 55 


 


Weight (lbs)  67.585 kg 


 


Intake:   


 


  Intake, IV Amount 1672.5 1100 


 


    Piperacillin Sodium/ 100 100 





    Tazobact 2.25 gm In   





    Sodium Chloride 0.9% 50   





    ml @ 100 mls/hr IV Q6HR   





    [facility] Rx#:[id number]   


 


    Sodium Chloride 0.9% 1, 1572.5 1000 





    000 ml @ 150 mls/hr IV .   





    Q6H40M [facility] Rx#:[id number]   


 


  Tube Feeding  1045 


 


  Other  460 


 


Output:   


 


  Urine  2550 


 


Other:   


 


  # Bowel Movements  2 


 


  Stool Characteristics Liquid Liquid 





 Brown Brown 


 


  Weight Source  Bedscale 











Active Medications: 


Current Medications





Acetaminophen (Tylenol)  650 mg PO Q4HR PRN


   PRN Reason: Pain or Fever >101


   Stop: 19 10:22


Albuterol Sulfate (Albuterol 2.5mg/3ml Neb Ud)  2.5 mg HHN Q2HR PRN


   PRN Reason: Shortness of Breath


   Stop: 19 10:22


   Last Admin: 19 07:16 Dose:  2.5 mg


Allopurinol (Zyloprim)  100 mg GT DAILY [facility]


   Stop: 19 08:59


   Last Admin: 19 08:39 Dose:  100 mg


Ascorbic Acid (Vitamin C)  500 mg GT DAILY [facility]


   Stop: 19 08:59


   Last Admin: 19 08:39 Dose:  500 mg


Chlorhexidine Gluconate (Peridex)  15 ml MM 0800,2000 [facility]


   Stop: 19 08:59


   Last Admin: 19 08:40 Dose:  15 ml


Docusate Sodium (Colace)  200 mg GT HS [facility]


   Stop: 19 20:59


   Last Admin: 19 20:35 Dose:  Not Given


Epoetin Azael (Epogen)  3,000 units SUBQ MWF@1600 [facility]


   Stop: 19 15:59


   Last Admin: 19 16:57 Dose:  3,000 units


Ferrous Sulfate (Iron)  7.5 mg GT DAILY [facility]


   Stop: 19 08:59


   Last Admin: 19 08:39 Dose:  7.5 mg


Piperacillin Sod/Tazobactam (Sod 2.25 gm/ Sodium Chloride)  50 mls @ 100 mls/hr 

IV Q6HR [facility]


   Stop: 19 00:00


   Last Infusion: 19 06:30 Dose:  Infused


Dextrose (D5w)  1,000 mls @ 75 mls/hr IV .[id number] [facility]


   Stop: 19 06:29


   Last Admin: 19 06:32 Dose:  75 mls/hr


Insulin Aspart (Novolog Insulin Sliding Scale)  0 units SUBQ Q6HR [facility]; Protocol


   Stop: 19 00:00


   Last Admin: 19 05:58 Dose:  2 units


Insulin Detemir (Levemir Insulin)  35 units SUBQ BID [facility]; Protocol


   Stop: 19 16:59


   Last Admin: 19 08:40 Dose:  35 units


Lactulose (Cephulac)  20 gm GT BID [facility]


   Stop: 19 16:59


   Last Admin: 19 08:41 Dose:  Not Given


Levothyroxine Sodium (Synthroid)  0.125 mg GT QDAC [facility]


   Stop: 19 07:29


   Last Admin: 19 08:38 Dose:  0.125 mg


Miscellaneous (Vte Chemical Prophylaxis Screen/ Admission)  1 ea MC PRN PRN


   PRN Reason: PROTOCOL


   Stop: 19 15:55


Multivitamins/Minerals (Theragran M)  15 ml GT DAILY [facility]


   Stop: 19 08:59


   Last Admin: 19 08:39 Dose:  15 ml


Mupirocin (Bactroban Oint)  1 appl NS BID [facility]


   Stop: 19 09:01


   Last Admin: 19 08:40 Dose:  1 appl


Ondansetron HCl (Zofran Odt)  4 mg PO Q6HR PRN


   PRN Reason: Nausea


Pantoprazole Sodium (Protonix)  40 mg IVP DAILY [facility]


   Stop: 19 08:59


   Last Admin: 19 08:39 Dose:  40 mg


Vitamin B Complex/Vit C/Folic Acid (Vitamin B Complex W/Vitamin C)  1 tab GT 

DAILY [facility]


   Stop: 19 08:59


   Last Admin: 19 08:39 Dose:  1 tab








General: no acute distress, well developed, well nourished


HEENT: atraumatic, normocephalic, PERRLA, EOMI, moist mucous membrane


Neck: supple, tracheostomy, no lymphadenopathy


Cardiovascular: S1S2, regular


Lungs: clear to auscultation bilaterally, clear to percussion


Abdomen: soft, bowel sounds, no tender, no distended, no mass, no rebound


Extremities: no cyanosis, no clubbing


Neurological: awake, other (aphasic, open eyes.)





- Procedures


Procedures: 


 Procedures











Procedure Code Date


 


RESPIRATORY VENTILATION, 24-96 CONSECUTIVE HOURS [id number]














Infectious Disease Assmt/Plan





- Assessment


Assessment: 





1. Leukocytosis, lactic acidosis, suspect sepsis.


2. Pneumonia, radiologically clear.


3. Diabetes mellitus type 2.


4. Acute renal failure on chronic renal disease, improving.


5. Lactic acidosis.


6. Respiratory failure, on ventilator.


7. Chronic liver disease.


8. Hypertension.


9. Hypothyroidism.


10. Diabetes mellitus type 2.


11. Chronic obstructive pulmonary disease.


12. Dementia.


13. Cerebrovascular accident, transient ischemic attack.


14. History of gout.








- Plan


Plan: 





Continue same treatment. check labs in am, and IVF.





Nutritional Asmnt/Malnutr-PDOC





- Dietary Evaluation


Malnutrition Findings (Please click <Entered> for more info): 








Nutritional Asmnt/Malnutrition                             Start:  19 13:

11


Text:                                                      Status: Active      

  


Freq:                                                                          

  


Protocol:                                                                      

  


 Document     19 13:11  [id number]  (Rec: 19 13:29  [id number]  REMEDIOS)


 Nutritional Asmnt/Malnutrition


     Patient General Information


      Nutritional Screening                      High Risk


      Diagnosis                                  sepsis, hyponatremia,


                                                 hypokalemia, UTI & dehydration


      Pertinent Medical Hx/Surgical Hx           COPD, resp failure on vent via


                                                 trach, Gtube, sepsis,


                                                 dysphagia, hepatic failure,


                                                 anemia, edema, cardiomyopathy,


                                                 AFIb, TIA/stroke, gout,


                                                 hypothyroidism, CKD


      Subjective Information                     Pt was seen resting in bed at


                                                 time of visit, glucerna 1.2


                                                 running at 60ml/hr. Renal


                                                 doctor has been consulted for


                                                 elevated BUN/Cr levels per RN.


                                                 Previous tube feeding order


                                                 found in chart was glucerna 1.


                                                 5 @60ml/hr continuous noted.


                                                 Will continue with glucerna 1.


                                                 2 at 60ml/hr continuous as it


                                                 meets 100% of nutrition needs,


                                                 providing 1728kcal and 86g


                                                 protein.


      Current Diet Order/ Nutrition Support      glucerna 1.2 @ 60ml/hr


                                                 continuous


      Pertinent Medications                      vit c, colace, epogen, iron,


                                                 novolog, levemir, lactulose,


                                                 synthroid, theragran, zofran,


                                                 protonix, piperacillin,


                                                 renvela, NaCl 0.9%, vit b


                                                 complex w/vit c


      Pertinent Labs                              Na 132, K 3.2, Cl 89, BUN


                                                 125, Cr 1.6, glucose 238, Mg


                                                 3.1, Triglycerides 813,


                                                 cholesterol 397, -272


                                                  Na 125, K 3.2, Cl 76, BUN


                                                 151, Cr 1.9, Glucose 237, Alb


                                                 4.0


     Nutritional Hx/Data


      Height                                     1.65 m


      Height (Calculated Centimeters)            165.1


      Current Weight (lbs)                       67.132 kg


      Weight (Calculated Kilograms)              67.1


      Weight (Calculated Grams)                  50058.7


      Ideal Body Weight                          125


      Body Mass Index (BMI)                      24.6


      Weight Status                              Approriate


     GI Symptoms


      GI Symptoms                                None


      Last BM                                    


      Food Allergies                             No


      Skin Integrity/Comment:                    reddened buttocks, darren 11


     Estimated Nutritional Goals


      BEE in Kcals:                              Using Current wt


      Calories/Kcals/Kg                          25-30


      Kcals Calculated                           


      Protein:                                   Using Current wt


      Protein g/k monitor renal labs


      Protein Calculated                         67


      Fluid: ml                                   (1ml/kcal)


     Nutritional Problem


      1. Problem


       Problem                                   altered nutrition related labs


       Etiology                                  renal dysfunction,


                                                 hyperglycemia, fluid/


                                                 electrolyte imbalance


       Signs/Symptoms:                           Na 132, K 3.2, Cl 89, ,


                                                 Cr 1.6, glucose 238, -


                                                 272


     Malnutrition Alert


      Is there a minimum of two criteria         No


       selected?                                 


       Query Text:Check all the applicable       


       criteria. A minimum of two criteria are   


       recommended for diagnosis of either       


       severe or non-severe malnutrition.        


     Malnutrition Related to Morbid Obesity


      Malnutrition related to morbid obesity     No


     Intervention/Recommendation


      Comments                                   1. Continue with glucerna 1.2


                                                 @60ml/hr continous. It


                                                 provides 1728kcal, 86g protein


                                                 , 1159ml free water, meeting


                                                 100% of nutritional needs.


                                                 Monitor renal labs. If BUN/


                                                 Crea continue elevated, will


                                                 consider decrease protein


                                                 intake.


                                                 2. Monitor TF rate, tolerance,


                                                 wt, skin integrity and labs


                                                 3. F/U as high risk in 2-3


                                                 days


     Expected Outcomes/Goals


      Expected Outcomes/Goals                    1. Pt to meet at least 75% of


                                                 nutritional needs via


                                                 nutrition support with


                                                 tolerance


                                                 2. Wt stability, skin to


                                                 remain intact, labs to


                                                 approach WNL.


                                                 Reviewed by Juanita Lewis RD

## 2019-01-25 NOTE — GENERAL PROGRESS NOTE
Subjective





- Review of Systems


Service Date: 19


Subjective: 





more awake, interacting, on vent





Objective





- Results


Result Diagrams: 


 19 04:00





 19 04:00


Recent Labs: 


 Laboratory Last Values











WBC  10.5 Th/cmm (4.8-10.8)   19  04:00    


 


RBC  2.36 Mil/cmm (3.80-5.20)  L  19  04:00    


 


Hgb  8.1 gm/dL (12-16)  L  19  04:00    


 


Hct  24.0 % (41.0-60)  L  19  04:00    


 


MCV  101.7 fl ()  H  19  04:00    


 


MCH  34.1 pg (27.0-31.0)  H  19  04:00    


 


MCHC Differential  33.5 pg (28.0-36.0)   19  04:00    


 


RDW  16.8 % (11.5-20.0)   19  04:00    


 


Plt Count  325 Th/cmm (150-400)   19  04:00    


 


MPV  6.6 fl  19  04:00    


 


Add Manual Diff  YES   19  04:51    


 


Neutrophils %  81.5 % (40.0-80.0)  H  19  04:00    


 


Band Neutrophils %  0 % (0-10)   19  04:51    


 


Lymphocytes %  7.2 % (20.0-50.0)  L  19  04:00    


 


Monocytes %  4.7 % (2.0-10.0)   19  04:00    


 


Eosinophils %  6.2 % (0.0-5.0)  H  19  04:00    


 


Basophils %  0.4 % (0.0-2.0)   19  04:00    


 


Neutrophils (Manual)  81.5 % (40-80)  H  19  04:00    


 


Lymphocytes  7.2 % (20-50)  L  19  04:00    


 


Monocytes  4.7 % (2-10)   19  04:00    


 


Eosinophils  6.2 % (0-5)  H  19  04:00    


 


Basophils  0.4 % (0-3)   19  04:00    


 


Eos Smear Source  URINE   19  15:00    


 


Eos Smear Total Cells  NONE SEEN  (NONE SEEN)   19  15:00    


 


Specimen Source  Arterial   19  09:05    


 


Sample Site  LB   19  09:05    


 


pH  7.47  (7.35-7.45)  H  19  09:05    


 


pCO2  39.0 mmHg (35.0-45.0)   19  09:05    


 


pO2  104.0 mmHg (80.0-100.0)  H  19  09:05    


 


HCO3  28.4 mEq/L (20.0-26.0)  H  19  09:05    


 


Base Excess  4.4 mEq/L (-3.0-3.0)  H  19  09:05    


 


O2 Saturation  98.0 % (92.0-100.0)   19  09:05    


 


Vent Rate  16   19  09:05    


 


Inspired O2  28   19  09:05    


 


Tidal Volume  450   19  09:05    


 


PEEP  5   19  09:05    


 


Critical Value  PW   19  09:05    


 


Sodium  148 mEq/L (136-145)  H  19  04:00    


 


Potassium  5.0 mEq/L (3.5-5.1)   19  04:00    


 


Chloride  116 mEq/L ()  H  19  04:00    


 


Carbon Dioxide  19.4 mEq/L (21.0-31.0)  L  19  04:00    


 


Anion Gap  17.6  (7.0-16.0)  H  19  04:00    


 


BUN  52 mg/dL (7-25)  H  19  04:00    


 


Creatinine  1.3 mg/dL (0.6-1.2)  H  19  04:00    


 


Est GFR ( Amer)  52.7 ml/min (>90)   19  04:00    


 


Est GFR (Non-Af Amer)  43.6 ml/min  19  04:00    


 


BUN/Creatinine Ratio  40.0   19  04:00    


 


Glucose  197 mg/dL ()  H  19  04:00    


 


POC Glucose  202 MG/DL (70 - 105)  H  19  11:49    


 


Plasma/Ser Osmolality  342 mOsmol/kg (280-301)  H  19  15:00    


 


Whole Bld Lactic Acid  2.60 mmol/L (0.60-1.99)  H*  19  06:00    


 


Uric Acid  6.3 mg/dL (2.3-6.6)   19  04:51    


 


Calcium  8.2 mg/dL (8.6-10.3)  L  19  04:00    


 


Phosphorus  2.0 mg/dL (2.5-5.0)  L  19  04:00    


 


Magnesium  2.9 mg/dL (1.9-2.7)  H  19  04:51    


 


Total Bilirubin  0.8 mg/dL (0.3-1.0)   19  04:51    


 


AST  69 U/L (13-39)  H  19  04:51    


 


ALT  89 U/L (7-52)  H  19  04:51    


 


Alkaline Phosphatase  99 U/L ()   19  04:51    


 


Ammonia  38 umol/L (16-53)   19  08:10    


 


Troponin I  0.05 ng/mL (0.01-0.05)   19  20:15    


 


B-Natriuretic Peptide  146.0 pg/mL (5.0-100.0)  H  19  04:25    


 


Total Protein  6.7 gm/dL (6.0-8.3)   19  04:51    


 


Albumin  3.2 gm/dL (3.7-5.3)  L  19  04:51    


 


Globulin  3.5 gm/dL  19  04:51    


 


Albumin/Globulin Ratio  0.9  (1.0-1.8)  L  19  04:51    


 


Triglycerides  813 mg/dL (<150)  H  19  04:25    


 


Cholesterol  397 mg/dL (<200)  H  19  04:25    


 


LDL Cholesterol Direct  187 mg/dL ()   19  04:25    


 


HDL Cholesterol  34 mg/dL (23-92)   19  04:25    


 


Lipase  174 U/L (11-82)  H  19  04:51    


 


TSH  3.28 uIU/ml (0.34-5.60)   19  04:25    


 


Urine Source  CLEAN C   19  20:30    


 


Urine Color  YELLOW   19  20:30    


 


Urine Clarity  HAZY  (CLEAR)   19  20:30    


 


Urine pH  5.5  (4.6 - 8.0)   19  20:30    


 


Ur Specific Gravity  1.010  (1.005-1.030)   19  20:30    


 


Urine Protein  NEGATIVE mg/dL (NEGATIVE)   19  20:30    


 


Urine Glucose (UA)  NEGATIVE mg/dL (NEGATIVE)   19  20:30    


 


Urine Ketones  NEGATIVE mg/dL (NEGATIVE)   19  20:30    


 


Urine Blood  NEGATIVE  (NEGATIVE)   19  20:30    


 


Urine Nitrate  NEGATIVE  (NEGATIVE)   19  20:30    


 


Urine Bilirubin  NEGATIVE  (NEGATIVE)   19  20:30    


 


Urine Urobilinogen  0.2 E.U./dL (0.2 - 1.0)   19  20:30    


 


Ur Leukocyte Esterase  SMALL  (NEGATIVE)  H  19  20:30    


 


Urine RBC  5-10 /hpf (0-5)  H  19  20:30    


 


Urine WBC  2-5 /hpf (0-5)   19  20:30    


 


Ur Epithelial Cells  RARE /lpf (FEW)   19  20:30    


 


Amorphous Sediment  FEW URATES  (NONE SEEN)   19  20:30    


 


Urine Bacteria  FEW /hpf (NONE SEEN)   19  20:30    


 


Urine Yeast  FEW /hpf (NONE SEEN)  H  19  20:30    


 


Ur Random Sodium  28 mmol/L  19  15:00    


 


Urine Creatinine  22.0 mg/dl (28.0-217.0)  L  19  15:00    


 


Microalb/Creat Ratio  291.5 mg/g creat (0.0-30.0)  H  19  15:00    














- Physical Exam


Vitals and I&O: 


 Vital Signs











Temp  99.4 F   19 08:00


 


Pulse  82   19 12:40


 


Resp  22   19 10:00


 


BP  125/60   19 10:00


 


Pulse Ox  100   19 12:40








 Intake & Output











 19





 18:59 06:59 18:59


 


Intake Total 1672.5 2605 


 


Output Total  2550 


 


Balance 1672.5 55 


 


Weight (lbs)  67.585 kg 


 


Intake:   


 


  Intake, IV Amount 1672.5 1100 


 


    Piperacillin Sodium/ 100 100 





    Tazobact 2.25 gm In   





    Sodium Chloride 0.9% 50   





    ml @ 100 mls/hr IV Q6HR   





    [facility] Rx#:[id number]   


 


    Sodium Chloride 0.9% 1, 1572.5 1000 





    000 ml @ 150 mls/hr IV .   





    Q6H40M [facility] Rx#:[id number]   


 


  Tube Feeding  1045 


 


  Other  460 


 


Output:   


 


  Urine  2550 


 


Other:   


 


  # Bowel Movements  2 


 


  Stool Characteristics Liquid Liquid Liquid





 Brown Brown Brown


 


  Weight Source  Bedscale 











Active Medications: 


Current Medications





Acetaminophen (Tylenol)  650 mg PO Q4HR PRN


   PRN Reason: Pain or Fever >101


   Stop: 19 10:22


Albuterol Sulfate (Albuterol 2.5mg/3ml Neb Ud)  2.5 mg HHN Q2HR PRN


   PRN Reason: Shortness of Breath


   Stop: 19 10:22


   Last Admin: 19 10:37 Dose:  2.5 mg


Allopurinol (Zyloprim)  100 mg GT DAILY [facility]


   Stop: 19 08:59


   Last Admin: 19 08:39 Dose:  100 mg


Ascorbic Acid (Vitamin C)  500 mg GT DAILY [facility]


   Stop: 19 08:59


   Last Admin: 19 08:39 Dose:  500 mg


Chlorhexidine Gluconate (Peridex)  15 ml MM 0800,2000 [facility]


   Stop: 19 08:59


   Last Admin: 19 08:40 Dose:  15 ml


Docusate Sodium (Colace)  200 mg GT HS [facility]


   Stop: 19 20:59


   Last Admin: 19 20:35 Dose:  Not Given


Epoetin Azael (Epogen)  3,000 units SUBQ MWF@1600 [facility]


   Stop: 19 15:59


   Last Admin: 19 16:57 Dose:  3,000 units


Ferrous Sulfate (Iron)  7.5 mg GT DAILY [facility]


   Stop: 19 08:59


   Last Admin: 19 08:39 Dose:  7.5 mg


Piperacillin Sod/Tazobactam (Sod 2.25 gm/ Sodium Chloride)  50 mls @ 100 mls/hr 

IV Q6HR [facility]


   Stop: 19 00:00


   Last Admin: 19 12:14 Dose:  100 mls/hr


Dextrose (D5w)  1,000 mls @ 75 mls/hr IV .[id number] [facility]


   Stop: 19 06:29


   Last Admin: 19 06:32 Dose:  75 mls/hr


Insulin Aspart (Novolog Insulin Sliding Scale)  0 units SUBQ Q6HR [facility]; Protocol


   Stop: 19 00:00


   Last Admin: 19 12:13 Dose:  4 units


Insulin Detemir (Levemir Insulin)  35 units SUBQ BID [facility]; Protocol


   Stop: 19 16:59


   Last Admin: 19 08:40 Dose:  35 units


Lactulose (Cephulac)  20 gm GT BID [facility]


   Stop: 19 16:59


   Last Admin: 19 08:41 Dose:  Not Given


Levothyroxine Sodium (Synthroid)  0.125 mg GT QDAC [facility]


   Stop: 19 07:29


   Last Admin: 19 08:38 Dose:  0.125 mg


Miscellaneous (Vte Chemical Prophylaxis Screen/ Admission)  1 ea MC PRN PRN


   PRN Reason: PROTOCOL


   Stop: 19 15:55


Multivitamins/Minerals (Theragran M)  15 ml GT DAILY [facility]


   Stop: 19 08:59


   Last Admin: 19 08:39 Dose:  15 ml


Mupirocin (Bactroban Oint)  1 appl NS BID [facility]


   Stop: 19 09:01


   Last Admin: 19 08:40 Dose:  1 appl


Ondansetron HCl (Zofran Odt)  4 mg PO Q6HR PRN


   PRN Reason: Nausea


Pantoprazole Sodium (Protonix)  40 mg IVP DAILY [facility]


   Stop: 19 08:59


   Last Admin: 19 08:39 Dose:  40 mg


Vitamin B Complex/Vit C/Folic Acid (Vitamin B Complex W/Vitamin C)  1 tab GT 

DAILY [facility]


   Stop: 19 08:59


   Last Admin: 19 08:39 Dose:  1 tab








General: Alert, No acute distress


HEENT: Atraumatic, Mucous membr. moist/pink


Neck: Supple, +2 carotid pulse wo bruit


Cardiovascular: Regular rate, Normal S1, Normal S2


Lungs: Normal air movement


Abdomen: Bowel sounds, Soft, Hepatomegaly


Extremities: no Edema


Neurological: Sensation intact


Skin: no Rash


Psych/Mental Status: Mood NL





- Procedures


Procedures: 


 Procedures











Procedure Code Date


 


RESPIRATORY VENTILATION, 24-96 CONSECUTIVE HOURS [id number] 19














Assessment/Plan





- Assessment


Assessment: 





JAMES on CKD


RFVD


Electrolyte Imbalance


Sepsei 2/2 Cx UTI


Chronic Liver Failure 2/2 NAFLD


Dyslipidemia


Ess Htn


T2DM


GB Stones











- Plan


Plan: 


Lab - Result Diagrams





 19 04:51 





 19 04:51 





Current Medications





Acetaminophen (Tylenol)  650 mg PO Q4HR PRN


   PRN Reason: Pain or Fever >101


   Stop: 19 10:22


Albuterol Sulfate (Albuterol 2.5mg/3ml Neb Ud)  2.5 mg HHN Q2HR PRN


   PRN Reason: Shortness of Breath


   Stop: 19 10:22


   Last Admin: 19 13:22 Dose:  2.5 mg


Allopurinol (Zyloprim)  300 mg GT DAILY [facility]


   Stop: 19 08:59


   Last Admin: 19 09:25 Dose:  Not Given


Ascorbic Acid (Vitamin C)  500 mg GT DAILY [facility]


   Stop: 19 08:59


   Last Admin: 19 09:25 Dose:  Not Given


Chlorhexidine Gluconate (Peridex)  15 ml MM 0800,2000 [facility]


   Stop: 19 08:59


   Last Admin: 19 09:23 Dose:  15 ml


Docusate Sodium (Colace)  200 mg GT HS [facility]


   Stop: 19 20:59


   Last Admin: 19 21:45 Dose:  Not Given


Epoetin Azael (Epogen)  3,000 units SUBQ MWF@1600 [facility]


   Stop: 19 15:59


   Last Admin: 19 16:57 Dose:  3,000 units


Ferrous Sulfate (Iron)  7.5 mg GT DAILY [facility]


   Stop: 19 08:59


   Last Admin: 19 09:26 Dose:  Not Given


Sodium Chloride (Nacl 0.9%)  1,000 mls @ 150 mls/hr IV .Q6H40M [facility]


   Stop: 19 22:59


   Last Admin: 19 09:27 Dose:  150 mls/hr


Piperacillin Sod/Tazobactam (Sod 2.25 gm/ Sodium Chloride)  50 mls @ 100 mls/hr 

IV Q6HR [facility]


   Stop: 19 00:00


   Last Infusion: 19 12:35 Dose:  Infused


Insulin Aspart (Novolog Insulin Sliding Scale)  0 units SUBQ Q6HR [facility]; Protocol


   Stop: 19 00:00


   Last Admin: 19 12:04 Dose:  2 units


Insulin Detemir (Levemir Insulin)  35 units SUBQ BID [facility]; Protocol


   Stop: 19 16:59


   Last Admin: 19 09:20 Dose:  35 units


Lactulose (Cephulac)  20 gm GT BID [facility]


   Stop: 19 16:59


   Last Admin: 19 09:26 Dose:  Not Given


Levothyroxine Sodium (Synthroid)  0.125 mg GT QDAC [facility]


   Stop: 19 07:29


   Last Admin: 19 06:51 Dose:  Not Given


Miscellaneous (Vte Chemical Prophylaxis Screen/ Admission)  1 ea  PRN PRN


   PRN Reason: PROTOCOL


   Stop: 19 15:55


Multivitamins/Minerals (Theragran M)  15 ml GT DAILY [facility]


   Stop: 19 08:59


   Last Admin: 19 09:26 Dose:  Not Given


Ondansetron HCl (Zofran Odt)  4 mg PO Q6HR PRN


   PRN Reason: Nausea


Pantoprazole Sodium (Protonix)  40 mg IVP DAILY [facility]


   Stop: 19 08:59


   Last Admin: 19 09:19 Dose:  40 mg


Sevelamer Carbonate (Renvela)  800 mg PO TID [facility]


   Stop: 19 13:59


   Last Admin: 19 13:23 Dose:  800 mg


Vitamin B Complex/Vit C/Folic Acid (Vitamin B Complex W/Vitamin C)  1 tab GT 

DAILY [facility]


   Stop: 19 08:59


   Last Admin: 19 09:26 Dose:  Not 





Lab - Result Diagrams





 19 04:00 





 19 04:00 





 








Kidney fnc better w/ BUN/CR of 52/1.3


good UOP


FENa 1.25% suggestive of intrinsic kidney failure


replace P04


switch to D5W, monitor BS closely


f/u electrolytes, cbc


low P04 due to sevelamer





Nutritional Asmnt/Malnutr-PDOC





- Dietary Evaluation


Malnutrition Findings (Please click <Entered> for more info): 








Nutritional Asmnt/Malnutrition                             Start:  19 13:

11


Text:                                                      Status: Active      

  


Freq:                                                                          

  


Protocol:                                                                      

  


 Document     19 13:11  JLI1  (Rec: 19 13:29  [email or web address]  REMEDIOS)


 Nutritional Asmnt/Malnutrition


     Patient General Information


      Nutritional Screening                      High Risk


      Diagnosis                                  sepsis, hyponatremia,


                                                 hypokalemia, UTI & dehydration


      Pertinent Medical Hx/Surgical Hx           COPD, resp failure on vent via


                                                 trach, Gtube, sepsis,


                                                 dysphagia, hepatic failure,


                                                 anemia, edema, cardiomyopathy,


                                                 AFIb, TIA/stroke, gout,


                                                 hypothyroidism, CKD


      Subjective Information                     Pt was seen resting in bed at


                                                 time of visit, glucerna 1.2


                                                 running at 60ml/hr. Renal


                                                 doctor has been consulted for


                                                 elevated BUN/Cr levels per RN.


                                                 Previous tube feeding order


                                                 found in chart was glucerna 1.


                                                 5 @60ml/hr continuous noted.


                                                 Will continue with glucerna 1.


                                                 2 at 60ml/hr continuous as it


                                                 meets 100% of nutrition needs,


                                                 providing 1728kcal and 86g


                                                 protein.


      Current Diet Order/ Nutrition Support      glucerna 1.2 @ 60ml/hr


                                                 continuous


      Pertinent Medications                      vit c, colace, epogen, iron,


                                                 novolog, levemir, lactulose,


                                                 synthroid, theragran, zofran,


                                                 protonix, piperacillin,


                                                 renvela, NaCl 0.9%, vit b


                                                 complex w/vit c


      Pertinent Labs                              Na 132, K 3.2, Cl 89, BUN


                                                 125, Cr 1.6, glucose 238, Mg


                                                 3.1, Triglycerides 813,


                                                 cholesterol 397, -272


                                                  Na 125, K 3.2, Cl 76, BUN


                                                 151, Cr 1.9, Glucose 237, Alb


                                                 4.0


     Nutritional Hx/Data


      Height                                     1.65 m


      Height (Calculated Centimeters)            165.1


      Current Weight (lbs)                       67.132 kg


      Weight (Calculated Kilograms)              67.1


      Weight (Calculated Grams)                  31075.7


      Ideal Body Weight                          125


      Body Mass Index (BMI)                      24.6


      Weight Status                              Approriate


     GI Symptoms


      GI Symptoms                                None


      Last BM                                    


      Food Allergies                             No


      Skin Integrity/Comment:                    reddened buttocks, darren 11


     Estimated Nutritional Goals


      BEE in Kcals:                              Using Current wt


      Calories/Kcals/Kg                          25-30


      Kcals Calculated                           


      Protein:                                   Using Current wt


      Protein g/k monitor renal labs


      Protein Calculated                         67


      Fluid: ml                                   (1ml/kcal)


     Nutritional Problem


      1. Problem


       Problem                                   altered nutrition related labs


       Etiology                                  renal dysfunction,


                                                 hyperglycemia, fluid/


                                                 electrolyte imbalance


       Signs/Symptoms:                           Na 132, K 3.2, Cl 89, ,


                                                 Cr 1.6, glucose 238, -


                                                 272


     Malnutrition Alert


      Is there a minimum of two criteria         No


       selected?                                 


       Query Text:Check all the applicable       


       criteria. A minimum of two criteria are   


       recommended for diagnosis of either       


       severe or non-severe malnutrition.        


     Malnutrition Related to Morbid Obesity


      Malnutrition related to morbid obesity     No


     Intervention/Recommendation


      Comments                                   1. Continue with glucerna 1.2


                                                 @60ml/hr continous. It


                                                 provides 1728kcal, 86g protein


                                                 , 1159ml free water, meeting


                                                 100% of nutritional needs.


                                                 Monitor renal labs. If BUN/


                                                 Crea continue elevated, will


                                                 consider decrease protein


                                                 intake.


                                                 2. Monitor TF rate, tolerance,


                                                 wt, skin integrity and labs


                                                 3. F/U as high risk in 2-3


                                                 days


     Expected Outcomes/Goals


      Expected Outcomes/Goals                    1. Pt to meet at least 75% of


                                                 nutritional needs via


                                                 nutrition support with


                                                 tolerance


                                                 2. Wt stability, skin to


                                                 remain intact, labs to


                                                 approach WNL.


                                                 Reviewed by Juanita Lewis RD

## 2019-01-26 LAB
ANION GAP SERPL CALC-SCNC: 17 MMOL/L (ref 7–16)
BASOPHILS # BLD AUTO: 0.1 TH/CUMM (ref 0–0.2)
BASOPHILS NFR BLD AUTO: 0.6 % (ref 0–2)
BUN SERPL-MCNC: 37 MG/DL (ref 7–25)
CALCIUM SERPL-MCNC: 8.1 MG/DL (ref 8.6–10.3)
CHLORIDE SERPL-SCNC: 109 MEQ/L (ref 98–107)
CO2 SERPL-SCNC: 20.1 MEQ/L (ref 21–31)
CREAT SERPL-MCNC: 1.2 MG/DL (ref 0.6–1.2)
EOSINOPHIL # BLD AUTO: 0.8 TH/CMM (ref 0.1–0.4)
EOSINOPHIL NFR BLD AUTO: 5.8 % (ref 0–5)
EOSINOPHIL NFR BLD: 3 % (ref 0–5)
ERYTHROCYTE [DISTWIDTH] IN BLOOD BY AUTOMATED COUNT: 16.4 % (ref 11.5–20)
GLUCOSE SERPL-MCNC: 153 MG/DL (ref 70–105)
HCT VFR BLD CALC: 23.8 % (ref 41–60)
HGB BLD-MCNC: 8 GM/DL (ref 12–16)
LYMPHOCYTE AB SER FC-ACNC: 1.1 TH/CMM (ref 1.5–3)
LYMPHOCYTES # BLD MANUAL: 6 % (ref 20–50)
LYMPHOCYTES NFR BLD AUTO: 7.4 % (ref 20–50)
MCH RBC QN AUTO: 34.2 PG (ref 27–31)
MCHC RBC AUTO-ENTMCNC: 33.8 PG (ref 28–36)
MCV RBC AUTO: 101.2 FL (ref 81–100)
MONOCYTES # BLD AUTO: 0.6 TH/CMM (ref 0.3–1)
MONOCYTES # BLD MANUAL: 6 % (ref 2–10)
MONOCYTES NFR BLD AUTO: 3.9 % (ref 2–10)
NEUTROPHILS # BLD: 11.9 TH/CMM (ref 1.8–8)
NEUTROPHILS NFR BLD AUTO: 82.3 % (ref 40–80)
NEUTROPHILS NFR BLD AUTO: 85 % (ref 40–80)
PLATELET # BLD: 355 TH/CMM (ref 150–400)
PMV BLD AUTO: 6.5 FL
POTASSIUM SERPL-SCNC: 5.1 MEQ/L (ref 3.5–5.1)
RBC # BLD AUTO: 2.35 MIL/CMM (ref 3.8–5.2)
SODIUM SERPL-SCNC: 141 MEQ/L (ref 136–145)
WBC # BLD AUTO: 14.5 TH/CMM (ref 4.8–10.8)

## 2019-01-26 RX ADMIN — CHLORHEXIDINE GLUCONATE SCH ML: 1.2 RINSE ORAL at 20:32

## 2019-01-26 RX ADMIN — INSULIN ASPART SCH UNITS: 100 INJECTION, SOLUTION INTRAVENOUS; SUBCUTANEOUS at 00:03

## 2019-01-26 RX ADMIN — Medication SCH EACH: at 17:51

## 2019-01-26 RX ADMIN — INSULIN ASPART SCH UNITS: 100 INJECTION, SOLUTION INTRAVENOUS; SUBCUTANEOUS at 11:35

## 2019-01-26 RX ADMIN — DOCUSATE SODIUM SCH: 50 LIQUID ORAL at 20:32

## 2019-01-26 RX ADMIN — Medication SCH TAB: at 09:00

## 2019-01-26 RX ADMIN — INSULIN ASPART SCH UNITS: 100 INJECTION, SOLUTION INTRAVENOUS; SUBCUTANEOUS at 05:43

## 2019-01-26 RX ADMIN — ALBUTEROL SULFATE PRN MG: 2.5 SOLUTION RESPIRATORY (INHALATION) at 19:15

## 2019-01-26 RX ADMIN — SODIUM CHLORIDE SCH MLS/HR: 9 INJECTION, SOLUTION INTRAVENOUS at 23:13

## 2019-01-26 RX ADMIN — ALBUTEROL SULFATE PRN MG: 2.5 SOLUTION RESPIRATORY (INHALATION) at 23:04

## 2019-01-26 RX ADMIN — ALBUTEROL SULFATE PRN MG: 2.5 SOLUTION RESPIRATORY (INHALATION) at 01:50

## 2019-01-26 RX ADMIN — SODIUM CHLORIDE SCH MLS/HR: 9 INJECTION, SOLUTION INTRAVENOUS at 05:43

## 2019-01-26 RX ADMIN — INSULIN DETEMIR SCH UNITS: 100 INJECTION, SOLUTION SUBCUTANEOUS at 09:01

## 2019-01-26 RX ADMIN — SODIUM CHLORIDE SCH MLS/HR: 9 INJECTION, SOLUTION INTRAVENOUS at 19:00

## 2019-01-26 RX ADMIN — Medication SCH ML: at 09:00

## 2019-01-26 RX ADMIN — LEVOTHYROXINE SODIUM SCH MG: 125 TABLET ORAL at 09:00

## 2019-01-26 RX ADMIN — INSULIN ASPART SCH UNITS: 100 INJECTION, SOLUTION INTRAVENOUS; SUBCUTANEOUS at 17:46

## 2019-01-26 RX ADMIN — CHLORHEXIDINE GLUCONATE SCH ML: 1.2 RINSE ORAL at 09:01

## 2019-01-26 RX ADMIN — INSULIN ASPART SCH UNITS: 100 INJECTION, SOLUTION INTRAVENOUS; SUBCUTANEOUS at 23:25

## 2019-01-26 RX ADMIN — SODIUM CHLORIDE SCH MLS/HR: 9 INJECTION, SOLUTION INTRAVENOUS at 11:35

## 2019-01-26 RX ADMIN — INSULIN DETEMIR SCH: 100 INJECTION, SOLUTION SUBCUTANEOUS at 17:49

## 2019-01-26 NOTE — PROGRESS NOTES
DATE:  01/26/2019



SUBJECTIVE:  The patient was seen in ICU.  The patient is awake, but poor

historian due to medical condition, otherwise the patient appears to be

comfortable.  Current condition is guarded.



OBJECTIVE:

VITAL SIGNS:  Temperature 99.5, heart rate 77, blood pressure 125/63,

respirations 20 and 99% oxygen saturation.

HEENT:  Head is atraumatic and normocephalic.  Eyes:  Bilateral conjunctivae are

clear.  Bilateral pupils equally round and reactive.

NECK:  Supple.  No JVD.

GENITOURINARY:  The patient has a tracheostomy connected to ventilator.

GASTROINTESTINAL:  Soft and nontender without guarding.  Positive bowel sounds.

MUSCULOSKELETAL:  No clubbing.  No cyanosis.  Positive muscle weakness.



ASSESSMENT:

1.  Sepsis.

2.  Diabetes.

3.  Ventilator dependent respiratory failure.

4.  Acute kidney injury.

5.  Chronic renal disease.

6.  Hyperlipidemia.

7.  Hypertension.

8.  Diabetes.



PLAN:  We will continue current treatment.  We will continue current antibiotic

and put the patient on aspiration precaution.  We will continue to provide

pulmonary support.  We will give the patient to ICU.  Treatment plans were

discussed with the patient's nurse.  Treatment plans were discussed with Dr. Coelho.





DD: 01/26/2019 08:58

DT: 01/26/2019 19:42

JOB# [id number]  [id number]

## 2019-01-26 NOTE — INFECTIOUS DISEASE PROG NOTE
Infectious Disease Subjective





- Review of Systems


Service Date: 19


Events since last encounter: 





None


Subjective: 





No new change..  No fever.





Infectious Disease Objective





- Results


Result Diagrams: 


 19 04:00





 19 04:00


Recent Labs: 


 Laboratory Last Values











WBC  14.5 Th/cmm (4.8-10.8)  H D 19  04:00    


 


RBC  2.35 Mil/cmm (3.80-5.20)  L  19  04:00    


 


Hgb  8.0 gm/dL (12-16)  L  19  04:00    


 


Hct  23.8 % (41.0-60)  L  19  04:00    


 


MCV  101.2 fl ()  H  19  04:00    


 


MCH  34.2 pg (27.0-31.0)  H  19  04:00    


 


MCHC Differential  33.8 pg (28.0-36.0)   19  04:00    


 


RDW  16.4 % (11.5-20.0)   19  04:00    


 


Plt Count  355 Th/cmm (150-400)   19  04:00    


 


MPV  6.5 fl  19  04:00    


 


Add Manual Diff  YES   19  04:51    


 


Neutrophils %  82.3 % (40.0-80.0)  H  19  04:00    


 


Band Neutrophils %  0 % (0-10)   19  04:51    


 


Lymphocytes %  7.4 % (20.0-50.0)  L  19  04:00    


 


Monocytes %  3.9 % (2.0-10.0)   19  04:00    


 


Eosinophils %  5.8 % (0.0-5.0)  H  19  04:00    


 


Basophils %  0.6 % (0.0-2.0)   19  04:00    


 


Neutrophils (Manual)  85 % (40-80)  H  19  04:00    


 


Lymphocytes  6 % (20-50)  L  19  04:00    


 


Monocytes  6 % (2-10)   19  04:00    


 


Eosinophils  3 % (0-5)   19  04:00    


 


Basophils  0.4 % (0-3)   19  04:00    


 


Eos Smear Source  URINE   19  15:00    


 


Eos Smear Total Cells  NONE SEEN  (NONE SEEN)   19  15:00    


 


Specimen Source  Arterial   19  09:05    


 


Sample Site  LB   19  09:05    


 


pH  7.47  (7.35-7.45)  H  19  09:05    


 


pCO2  39.0 mmHg (35.0-45.0)   19  09:05    


 


pO2  104.0 mmHg (80.0-100.0)  H  19  09:05    


 


HCO3  28.4 mEq/L (20.0-26.0)  H  19  09:05    


 


Base Excess  4.4 mEq/L (-3.0-3.0)  H  19  09:05    


 


O2 Saturation  98.0 % (92.0-100.0)   19  09:05    


 


Vent Rate  16   19  09:05    


 


Inspired O2  28   19  09:05    


 


Tidal Volume  450   19  09:05    


 


PEEP  5   19  09:05    


 


Critical Value  PW   19  09:05    


 


Sodium  141 mEq/L (136-145)   19  04:00    


 


Potassium  5.1 mEq/L (3.5-5.1)   19  04:00    


 


Chloride  109 mEq/L ()  H  19  04:00    


 


Carbon Dioxide  20.1 mEq/L (21.0-31.0)  L  19  04:00    


 


Anion Gap  17.0  (7.0-16.0)  H  19  04:00    


 


BUN  37 mg/dL (7-25)  H  19  04:00    


 


Creatinine  1.2 mg/dL (0.6-1.2)   19  04:00    


 


Est GFR ( Amer)  57.8 ml/min (>90)   19  04:00    


 


Est GFR (Non-Af Amer)  47.8 ml/min  19  04:00    


 


BUN/Creatinine Ratio  30.8   19  04:00    


 


Glucose  153 mg/dL ()  H  19  04:00    


 


POC Glucose  174 MG/DL (70 - 105)  H  19  11:06    


 


Plasma/Ser Osmolality  342 mOsmol/kg (280-301)  H  19  15:00    


 


Whole Bld Lactic Acid  2.59 mmol/L (0.60-1.99)  H*  19  07:09    


 


Uric Acid  6.3 mg/dL (2.3-6.6)   19  04:51    


 


Calcium  8.1 mg/dL (8.6-10.3)  L  19  04:00    


 


Phosphorus  2.0 mg/dL (2.5-5.0)  L  19  04:00    


 


Magnesium  2.9 mg/dL (1.9-2.7)  H  19  04:51    


 


Total Bilirubin  0.8 mg/dL (0.3-1.0)   19  04:51    


 


AST  69 U/L (13-39)  H  19  04:51    


 


ALT  89 U/L (7-52)  H  19  04:51    


 


Alkaline Phosphatase  99 U/L ()   19  04:51    


 


Ammonia  38 umol/L (16-53)   19  08:10    


 


Troponin I  0.05 ng/mL (0.01-0.05)   19  20:15    


 


B-Natriuretic Peptide  146.0 pg/mL (5.0-100.0)  H  19  04:25    


 


Total Protein  6.7 gm/dL (6.0-8.3)   19  04:51    


 


Albumin  3.2 gm/dL (3.7-5.3)  L  19  04:51    


 


Globulin  3.5 gm/dL  19  04:51    


 


Albumin/Globulin Ratio  0.9  (1.0-1.8)  L  19  04:51    


 


Triglycerides  813 mg/dL (<150)  H  19  04:25    


 


Cholesterol  397 mg/dL (<200)  H  19  04:25    


 


LDL Cholesterol Direct  187 mg/dL ()   19  04:25    


 


HDL Cholesterol  34 mg/dL (23-92)   19  04:25    


 


Lipase  174 U/L (11-82)  H  19  04:51    


 


TSH  3.28 uIU/ml (0.34-5.60)   19  04:25    


 


Urine Source  CLEAN C   19  20:30    


 


Urine Color  YELLOW   19  20:30    


 


Urine Clarity  HAZY  (CLEAR)   19  20:30    


 


Urine pH  5.5  (4.6 - 8.0)   19  20:30    


 


Ur Specific Gravity  1.010  (1.005-1.030)   19  20:30    


 


Urine Protein  NEGATIVE mg/dL (NEGATIVE)   19  20:30    


 


Urine Glucose (UA)  NEGATIVE mg/dL (NEGATIVE)   19  20:30    


 


Urine Ketones  NEGATIVE mg/dL (NEGATIVE)   19  20:30    


 


Urine Blood  NEGATIVE  (NEGATIVE)   19  20:30    


 


Urine Nitrate  NEGATIVE  (NEGATIVE)   19  20:30    


 


Urine Bilirubin  NEGATIVE  (NEGATIVE)   19  20:30    


 


Urine Urobilinogen  0.2 E.U./dL (0.2 - 1.0)   19  20:30    


 


Ur Leukocyte Esterase  SMALL  (NEGATIVE)  H  19  20:30    


 


Urine RBC  5-10 /hpf (0-5)  H  19  20:30    


 


Urine WBC  2-5 /hpf (0-5)   19  20:30    


 


Ur Epithelial Cells  RARE /lpf (FEW)   19  20:30    


 


Amorphous Sediment  FEW URATES  (NONE SEEN)   19  20:30    


 


Urine Bacteria  FEW /hpf (NONE SEEN)   19  20:30    


 


Urine Yeast  FEW /hpf (NONE SEEN)  H  19  20:30    


 


Ur Random Sodium  28 mmol/L  19  15:00    


 


Urine Creatinine  22.0 mg/dl (28.0-217.0)  L  19  15:00    


 


Microalb/Creat Ratio  291.5 mg/g creat (0.0-30.0)  H  19  15:00    














- Physical Exam


Vitals and I&O: 


 Vital Signs











Temp  99.7 F   19 12:00


 


Pulse  73   19 16:00


 


Resp  23   19 16:00


 


BP  120/59   19 16:00


 


Pulse Ox  98   19 16:00








 Intake & Output











 19





 18:59 06:59 18:59


 


Intake Total 300 1850 1546.25


 


Output Total 1100 1000 


 


Balance -.25


 


Weight (lbs) 67.585 kg 67.857 kg 


 


Intake:   


 


  Intake, IV Amount 100 1100 1546.25


 


    Dextrose 5% 1,000 ml @ 75  1000 996.25





    mls/hr IV .[id number] [facility]   





    Rx#:[id number]   


 


    Piperacillin Sodium/ 100 100 50





    Tazobact 2.25 gm In   





    Sodium Chloride 0.9% 50   





    ml @ 100 mls/hr IV Q6HR   





    [facility] Rx#:[id number]   


 


    Vancomycin HCl 1.5 gm In   500





    Sodium Chloride 0.9% 500   





    ml @ 250 mls/hr IV Q24H   





    [facility] Rx#:[id number]   


 


  Tube Feeding  550 


 


  Other 200 200 


 


Output:   


 


  Urine 1100 1000 


 


Other:   


 


  # Bowel Movements 3 0 


 


  Stool Characteristics Liquid  Formed





 Brown  Brown


 


  Weight Source Bedscale Bedscale 











Active Medications: 


Current Medications





Acetaminophen (Tylenol)  650 mg PO Q4HR PRN


   PRN Reason: Pain or Fever >101


   Stop: 19 10:22


   Last Admin: 19 09:40 Dose:  650 mg


Albuterol Sulfate (Albuterol 2.5mg/3ml Neb Ud)  2.5 mg HHN Q2HR PRN


   PRN Reason: Shortness of Breath


   Stop: 19 10:22


   Last Admin: 19 01:50 Dose:  2.5 mg


Allopurinol (Zyloprim)  100 mg GT DAILY [facility]


   Stop: 19 08:59


   Last Admin: 19 09:00 Dose:  100 mg


Ascorbic Acid (Vitamin C)  500 mg GT DAILY [facility]


   Stop: 19 08:59


   Last Admin: 19 09:01 Dose:  500 mg


Chlorhexidine Gluconate (Peridex)  15 ml MM 0800,2000 [facility]


   Stop: 19 08:59


   Last Admin: 19 09:01 Dose:  15 ml


Docusate Sodium (Colace)  200 mg GT HS [facility]


   Stop: 19 20:59


   Last Admin: 19 20:27 Dose:  Not Given


Epoetin Azael (Epogen)  3,000 units SUBQ MWF@1600 [facility]


   Stop: 19 15:59


   Last Admin: 19 17:13 Dose:  3,000 units


Ferrous Sulfate (Iron)  7.5 mg GT DAILY [facility]


   Stop: 19 08:59


   Last Admin: 19 09:00 Dose:  7.5 mg


Piperacillin Sod/Tazobactam (Sod 2.25 gm/ Sodium Chloride)  50 mls @ 100 mls/hr 

IV Q6HR [facility]


   Stop: 19 00:00


   Last Infusion: 19 12:05 Dose:  Infused


Dextrose (D5w)  1,000 mls @ 75 mls/hr IV .[id number] [facility]


   Stop: 19 06:29


   Last Admin: 19 09:44 Dose:  75 mls/hr


Vancomycin HCl 1.5 gm/ Sodium (Chloride)  500 mls @ 250 mls/hr IV Q24H [facility]


   Stop: 19 12:59


   Last Infusion: 19 15:45 Dose:  Infused


Insulin Aspart (Novolog Insulin Sliding Scale)  0 units SUBQ Q6HR [facility]; Protocol


   Stop: 19 00:00


   Last Admin: 19 11:35 Dose:  2 units


Insulin Detemir (Levemir Insulin)  35 units SUBQ BID [facility]; Protocol


   Stop: 19 16:59


   Last Admin: 19 09:01 Dose:  35 units


Lactobacillus Rhamnosus (Culturelle 15b)  1 each PO DAILY [facility]


   Stop: 19 15:59


Levothyroxine Sodium (Synthroid)  0.125 mg GT QDAC [facility]


   Stop: 19 07:29


   Last Admin: 19 09:00 Dose:  0.125 mg


Miscellaneous (Vte Chemical Prophylaxis Screen/ Admission)  1 ea MC PRN PRN


   PRN Reason: PROTOCOL


   Stop: 19 15:55


Miscellaneous (Vancomycin Iv Per Pharmacy)  1 ea MC PRN PRN


   PRN Reason: PROTOCOL


   Stop: 19 11:09


Miscellaneous (Probiotic Screen)  1 ea MC PRN PRN


   PRN Reason: PROTOCOL


   Stop: 19 15:19


Multivitamins/Minerals (Theragran M)  15 ml GT DAILY [facility]


   Stop: 19 08:59


   Last Admin: 19 09:00 Dose:  15 ml


Mupirocin (Bactroban Oint)  1 appl NS BID [facility]


   Stop: 19 09:01


   Last Admin: 19 09:00 Dose:  1 appl


Ondansetron HCl (Zofran Odt)  4 mg PO Q6HR PRN


   PRN Reason: Nausea


Pantoprazole Sodium (Protonix)  40 mg IVP DAILY [facility]


   Stop: 19 08:59


   Last Admin: 19 09:00 Dose:  40 mg


Vitamin B Complex/Vit C/Folic Acid (Vitamin B Complex W/Vitamin C)  1 tab GT 

DAILY [facility]


   Stop: 19 08:59


   Last Admin: 19 09:00 Dose:  1 tab








General: no acute distress, well developed, well nourished


HEENT: atraumatic, normocephalic, PERRLA, EOMI, moist mucous membrane


Neck: supple, no thyromegaly


Cardiovascular: S1S2, regular


Lungs: clear to auscultation bilaterally, clear to percussion


Abdomen: soft, bowel sounds, no tender, no distended, no hepatomegaly, no 

splenomegaly


Extremities: no cyanosis, no clubbing, no edema


Neurological: awake, alert, other (open eyes.)


Skin: intact





- Procedures


Procedures: 


 Procedures











Procedure Code Date


 


RESPIRATORY VENTILATION, 24-96 CONSECUTIVE HOURS [id number] 19














Infectious Disease Assmt/Plan





- Assessment


Assessment: 





1. Leukocytosis, lactic acidosis, suspect sepsis.


2. Pneumonia, radiologically clear.


3. Diabetes mellitus type 2.


4. Acute renal failure on chronic renal disease, improving.


5. Lactic acidosis.


6. Respiratory failure, on ventilator.


7. Chronic liver disease.


8. Hypertension.


9. Hypothyroidism.


10. Diabetes mellitus type 2.


11. Chronic obstructive pulmonary disease.


12. Dementia.


13. Cerebrovascular accident, transient ischemic attack.


14. History of gout.








- Plan


Plan: 





Continue same treatment. check labs in am, and IVF.





Nutritional Asmnt/Malnutr-PDOC





- Dietary Evaluation


Malnutrition Findings (Please click <Entered> for more info): 








Nutritional Asmnt/Malnutrition                             Start:  19 13:

11


Text:                                                      Status: Active      

  


Freq:                                                                          

  


Protocol:                                                                      

  


 Document     19 13:11  [id number]  (Rec: 19 13:29  [id number]  REMEDIOS)


 Nutritional Asmnt/Malnutrition


     Patient General Information


      Nutritional Screening                      High Risk


      Diagnosis                                  sepsis, hyponatremia,


                                                 hypokalemia, UTI & dehydration


      Pertinent Medical Hx/Surgical Hx           COPD, resp failure on vent via


                                                 trach, Gtube, sepsis,


                                                 dysphagia, hepatic failure,


                                                 anemia, edema, cardiomyopathy,


                                                 AFIb, TIA/stroke, gout,


                                                 hypothyroidism, CKD


      Subjective Information                     Pt was seen resting in bed at


                                                 time of visit, glucerna 1.2


                                                 running at 60ml/hr. Renal


                                                 doctor has been consulted for


                                                 elevated BUN/Cr levels per RN.


                                                 Previous tube feeding order


                                                 found in chart was glucerna 1.


                                                 5 @60ml/hr continuous noted.


                                                 Will continue with glucerna 1.


                                                 2 at 60ml/hr continuous as it


                                                 meets 100% of nutrition needs,


                                                 providing 1728kcal and 86g


                                                 protein.


      Current Diet Order/ Nutrition Support      glucerna 1.2 @ 60ml/hr


                                                 continuous


      Pertinent Medications                      vit c, colace, epogen, iron,


                                                 novolog, levemir, lactulose,


                                                 synthroid, theragran, zofran,


                                                 protonix, piperacillin,


                                                 renvela, NaCl 0.9%, vit b


                                                 complex w/vit c


      Pertinent Labs                              Na 132, K 3.2, Cl 89, BUN


                                                 125, Cr 1.6, glucose 238, Mg


                                                 3.1, Triglycerides 813,


                                                 cholesterol 397, -272


                                                  Na 125, K 3.2, Cl 76, BUN


                                                 151, Cr 1.9, Glucose 237, Alb


                                                 4.0


     Nutritional Hx/Data


      Height                                     1.65 m


      Height (Calculated Centimeters)            165.1


      Current Weight (lbs)                       67.132 kg


      Weight (Calculated Kilograms)              67.1


      Weight (Calculated Grams)                  70188.7


      Ideal Body Weight                          125


      Body Mass Index (BMI)                      24.6


      Weight Status                              Approriate


     GI Symptoms


      GI Symptoms                                None


      Last BM                                    


      Food Allergies                             No


      Skin Integrity/Comment:                    reddened buttocks, darren 11


     Estimated Nutritional Goals


      BEE in Kcals:                              Using Current wt


      Calories/Kcals/Kg                          25-30


      Kcals Calculated                           


      Protein:                                   Using Current wt


      Protein g/k monitor renal labs


      Protein Calculated                         67


      Fluid: ml                                   (1ml/kcal)


     Nutritional Problem


      1. Problem


       Problem                                   altered nutrition related labs


       Etiology                                  renal dysfunction,


                                                 hyperglycemia, fluid/


                                                 electrolyte imbalance


       Signs/Symptoms:                           Na 132, K 3.2, Cl 89, ,


                                                 Cr 1.6, glucose 238, -


                                                 272


     Malnutrition Alert


      Is there a minimum of two criteria         No


       selected?                                 


       Query Text:Check all the applicable       


       criteria. A minimum of two criteria are   


       recommended for diagnosis of either       


       severe or non-severe malnutrition.        


     Malnutrition Related to Morbid Obesity


      Malnutrition related to morbid obesity     No


     Intervention/Recommendation


      Comments                                   1. Continue with glucerna 1.2


                                                 @60ml/hr continous. It


                                                 provides 1728kcal, 86g protein


                                                 , 1159ml free water, meeting


                                                 100% of nutritional needs.


                                                 Monitor renal labs. If BUN/


                                                 Crea continue elevated, will


                                                 consider decrease protein


                                                 intake.


                                                 2. Monitor TF rate, tolerance,


                                                 wt, skin integrity and labs


                                                 3. F/U as high risk in 2-3


                                                 days


     Expected Outcomes/Goals


      Expected Outcomes/Goals                    1. Pt to meet at least 75% of


                                                 nutritional needs via


                                                 nutrition support with


                                                 tolerance


                                                 2. Wt stability, skin to


                                                 remain intact, labs to


                                                 approach WNL.


                                                 Reviewed by Juanita Lewis RD

## 2019-01-27 LAB
ALBUMIN SERPL-MCNC: 3.2 GM/DL (ref 3.7–5.3)
ALBUMIN/GLOB SERPL: 1.1 {RATIO} (ref 1–1.8)
ALP SERPL-CCNC: 85 U/L (ref 34–104)
ALT SERPL-CCNC: 102 U/L (ref 7–52)
ANION GAP SERPL CALC-SCNC: 14.5 MMOL/L (ref 7–16)
AST SERPL-CCNC: 79 U/L (ref 13–39)
BASOPHILS NFR BLD: 0 % (ref 0–3)
BILIRUB SERPL-MCNC: 1 MG/DL (ref 0.3–1)
BUN SERPL-MCNC: 30 MG/DL (ref 7–25)
CALCIUM SERPL-MCNC: 7.8 MG/DL (ref 8.6–10.3)
CHLORIDE SERPL-SCNC: 103 MEQ/L (ref 98–107)
CO2 SERPL-SCNC: 18.7 MEQ/L (ref 21–31)
CREAT SERPL-MCNC: 1.1 MG/DL (ref 0.6–1.2)
EOSINOPHIL NFR BLD: 5 % (ref 0–5)
ERYTHROCYTE [DISTWIDTH] IN BLOOD BY AUTOMATED COUNT: 16.7 % (ref 11.5–20)
GLOBULIN SER-MCNC: 2.9 GM/DL
GLUCOSE SERPL-MCNC: 201 MG/DL (ref 70–105)
HCT VFR BLD CALC: 21.9 % (ref 41–60)
HGB BLD-MCNC: 7.5 GM/DL (ref 12–16)
LYMPHOCYTES # BLD MANUAL: 9 % (ref 20–50)
MCH RBC QN AUTO: 34.5 PG (ref 27–31)
MCHC RBC AUTO-ENTMCNC: 34.2 PG (ref 28–36)
MCV RBC AUTO: 100.8 FL (ref 81–100)
MONOCYTES # BLD MANUAL: 3 % (ref 2–10)
NEUTROPHILS NFR BLD AUTO: 83 % (ref 40–80)
NEUTS BAND NFR BLD: 0 % (ref 0–10)
PLATELET # BLD: 372 TH/CMM (ref 150–400)
PMV BLD AUTO: 6.2 FL
POTASSIUM SERPL-SCNC: 5.2 MEQ/L (ref 3.5–5.1)
RBC # BLD AUTO: 2.17 MIL/CMM (ref 3.8–5.2)
SODIUM SERPL-SCNC: 131 MEQ/L (ref 136–145)
WBC # BLD AUTO: 13 TH/CMM (ref 4.8–10.8)

## 2019-01-27 RX ADMIN — SODIUM CHLORIDE SCH MLS/HR: 9 INJECTION, SOLUTION INTRAVENOUS at 05:44

## 2019-01-27 RX ADMIN — Medication SCH ML: at 09:31

## 2019-01-27 RX ADMIN — ALBUTEROL SULFATE PRN MG: 2.5 SOLUTION RESPIRATORY (INHALATION) at 04:56

## 2019-01-27 RX ADMIN — LEVOTHYROXINE SODIUM SCH MG: 125 TABLET ORAL at 09:33

## 2019-01-27 RX ADMIN — Medication SCH EACH: at 09:31

## 2019-01-27 RX ADMIN — INSULIN DETEMIR SCH UNITS: 100 INJECTION, SOLUTION SUBCUTANEOUS at 09:31

## 2019-01-27 RX ADMIN — CHLORHEXIDINE GLUCONATE SCH ML: 1.2 RINSE ORAL at 09:34

## 2019-01-27 RX ADMIN — Medication SCH TAB: at 09:31

## 2019-01-27 RX ADMIN — INSULIN ASPART SCH UNITS: 100 INJECTION, SOLUTION INTRAVENOUS; SUBCUTANEOUS at 05:35
